# Patient Record
Sex: FEMALE | Race: WHITE | NOT HISPANIC OR LATINO
[De-identification: names, ages, dates, MRNs, and addresses within clinical notes are randomized per-mention and may not be internally consistent; named-entity substitution may affect disease eponyms.]

---

## 2017-06-08 ENCOUNTER — RESULT REVIEW (OUTPATIENT)
Age: 77
End: 2017-06-08

## 2017-07-26 ENCOUNTER — APPOINTMENT (OUTPATIENT)
Dept: ELECTROPHYSIOLOGY | Facility: CLINIC | Age: 77
End: 2017-07-26

## 2017-08-30 ENCOUNTER — NON-APPOINTMENT (OUTPATIENT)
Age: 77
End: 2017-08-30

## 2017-08-30 ENCOUNTER — APPOINTMENT (OUTPATIENT)
Dept: ELECTROPHYSIOLOGY | Facility: CLINIC | Age: 77
End: 2017-08-30

## 2017-08-30 VITALS — HEART RATE: 83 BPM | OXYGEN SATURATION: 98 % | DIASTOLIC BLOOD PRESSURE: 78 MMHG | SYSTOLIC BLOOD PRESSURE: 165 MMHG

## 2017-08-30 VITALS — DIASTOLIC BLOOD PRESSURE: 80 MMHG | SYSTOLIC BLOOD PRESSURE: 135 MMHG

## 2017-12-04 ENCOUNTER — APPOINTMENT (OUTPATIENT)
Dept: ULTRASOUND IMAGING | Facility: CLINIC | Age: 77
End: 2017-12-04
Payer: MEDICARE

## 2017-12-04 ENCOUNTER — OUTPATIENT (OUTPATIENT)
Dept: OUTPATIENT SERVICES | Facility: HOSPITAL | Age: 77
LOS: 1 days | End: 2017-12-04
Payer: MEDICARE

## 2017-12-04 DIAGNOSIS — Z98.89 OTHER SPECIFIED POSTPROCEDURAL STATES: Chronic | ICD-10-CM

## 2017-12-04 DIAGNOSIS — Z00.8 ENCOUNTER FOR OTHER GENERAL EXAMINATION: ICD-10-CM

## 2017-12-04 PROCEDURE — 93971 EXTREMITY STUDY: CPT | Mod: 26

## 2017-12-04 PROCEDURE — 93971 EXTREMITY STUDY: CPT

## 2018-03-05 ENCOUNTER — APPOINTMENT (OUTPATIENT)
Dept: INFECTIOUS DISEASE | Facility: CLINIC | Age: 78
End: 2018-03-05
Payer: SELF-PAY

## 2018-03-05 DIAGNOSIS — Z71.89 OTHER SPECIFIED COUNSELING: ICD-10-CM

## 2018-03-05 PROCEDURE — 90472 IMMUNIZATION ADMIN EACH ADD: CPT | Mod: NC

## 2018-03-05 PROCEDURE — 90471 IMMUNIZATION ADMIN: CPT | Mod: NC

## 2018-03-05 PROCEDURE — 99401 PREV MED CNSL INDIV APPRX 15: CPT | Mod: 25

## 2018-03-05 PROCEDURE — 90632 HEPA VACCINE ADULT IM: CPT

## 2018-03-05 PROCEDURE — 90691 TYPHOID VACCINE IM: CPT

## 2018-03-05 RX ORDER — ATOVAQUONE AND PROGUANIL HYDROCHLORIDE 250; 100 MG/1; MG/1
250-100 TABLET, FILM COATED ORAL DAILY
Qty: 30 | Refills: 0 | Status: ACTIVE | COMMUNITY
Start: 2018-03-05 | End: 1900-01-01

## 2018-07-13 ENCOUNTER — APPOINTMENT (OUTPATIENT)
Dept: PLASTIC SURGERY | Facility: CLINIC | Age: 78
End: 2018-07-13

## 2018-12-11 ENCOUNTER — APPOINTMENT (OUTPATIENT)
Dept: INFECTIOUS DISEASE | Facility: CLINIC | Age: 78
End: 2018-12-11
Payer: SELF-PAY

## 2018-12-11 PROCEDURE — 90632 HEPA VACCINE ADULT IM: CPT

## 2018-12-11 PROCEDURE — 90471 IMMUNIZATION ADMIN: CPT | Mod: NC

## 2019-03-18 ENCOUNTER — APPOINTMENT (OUTPATIENT)
Dept: ORTHOPEDIC SURGERY | Facility: CLINIC | Age: 79
End: 2019-03-18

## 2019-03-28 ENCOUNTER — APPOINTMENT (OUTPATIENT)
Dept: ORTHOPEDIC SURGERY | Facility: CLINIC | Age: 79
End: 2019-03-28
Payer: MEDICARE

## 2019-03-28 DIAGNOSIS — G56.01 CARPAL TUNNEL SYNDROME, RIGHT UPPER LIMB: ICD-10-CM

## 2019-03-28 PROCEDURE — 99203 OFFICE O/P NEW LOW 30 MIN: CPT

## 2019-03-28 PROCEDURE — 99213 OFFICE O/P EST LOW 20 MIN: CPT

## 2019-03-28 PROCEDURE — 73110 X-RAY EXAM OF WRIST: CPT | Mod: RT

## 2019-03-28 NOTE — HISTORY OF PRESENT ILLNESS
[de-identified] : Patient is a 78 year old female who presents c/o right wrist and thumb discomfort, present for several months. She localizes the symptoms over the radial aspect at the base of the thumb. She describes the symptoms as burning in quality rather than pain. There is also intermittent numbness in the hand. She states that the burning in the thumb and wrist does not radiate into the hand or forearm.

## 2019-03-28 NOTE — END OF VISIT
[FreeTextEntry3] : I, Moncho Wilkes MD, ordering physician, have read and attest that all the information, medical decision making and discharge instructions within are true and accurate.

## 2019-03-28 NOTE — ADDENDUM
[FreeTextEntry1] : I, Renea Pacheco wrote this note acting as a scribe for Dr. Moncho Wilkes on Mar 28, 2019.

## 2019-03-28 NOTE — PHYSICAL EXAM
[de-identified] : Patient is WDWN, alert, and in no acute distress. Breathing is unlabored. She is grossly oriented to person, place, and time. \par \par Right Hand: There is basal joint tenderness, full arc of motion in the fingers with pain on thumb ROM. No pain with resisted wrist flexion. All intrinsic and extrinsic hand muscles 5/5. No joint instability on provocative testing. Sensation is intact to light touch. There are no skin lesions or discoloration. \par Tests/Signs: Positive thumb grind test. Positive Phalen's.  [de-identified] : AP, lateral and oblique views of the right hand were obtained today on 03/28/2019 and revealed thumb basal joint osteoarthritis.

## 2019-03-28 NOTE — DISCUSSION/SUMMARY
[de-identified] : The underlying pathophysiology was reviewed with the patient. Treatment options were discussed including; observation, NSAIDS, analgesics, injection, bracing, and surgical intervention. \par \par Patient may return to the office for a cortisone injection for either the carpal tunnel or basal joint osteoarthritis.\par The patient was advised to soak hand in warm water and Epsom salt. \par Wear a thumb spica brace as needed. \par NSAIDs as tolerated. \par Follow up as needed.

## 2019-03-28 NOTE — PHYSICAL EXAM
[de-identified] : Patient is WDWN, alert, and in no acute distress. Breathing is unlabored. She is grossly oriented to person, place, and time. \par \par Right Hand: There is basal joint tenderness, full arc of motion in the fingers with pain on thumb ROM. No pain with resisted wrist flexion. All intrinsic and extrinsic hand muscles 5/5. No joint instability on provocative testing. Sensation is intact to light touch. There are no skin lesions or discoloration. \par Tests/Signs: Positive thumb grind test. Positive Phalen's.  [de-identified] : AP, lateral and oblique views of the right hand were obtained today on 03/28/2019 and revealed thumb basal joint osteoarthritis.

## 2019-03-28 NOTE — CONSULT LETTER
[Dear  ___] : Dear  [unfilled], [Consult Letter:] : I had the pleasure of evaluating your patient, [unfilled]. [Please see my note below.] : Please see my note below. [Consult Closing:] : Thank you very much for allowing me to participate in the care of this patient.  If you have any questions, please do not hesitate to contact me. [Sincerely,] : Sincerely, [FreeTextEntry2] : LOLIS DIAL  [FreeTextEntry3] : Dr. Moncho Wilkes

## 2019-03-28 NOTE — DISCUSSION/SUMMARY
[de-identified] : The underlying pathophysiology was reviewed with the patient. Treatment options were discussed including; observation, NSAIDS, analgesics, injection, bracing, and surgical intervention. \par \par Patient may return to the office for a cortisone injection for either the carpal tunnel or basal joint osteoarthritis.\par The patient was advised to soak hand in warm water and Epsom salt. \par Wear a thumb spica brace as needed. \par NSAIDs as tolerated. \par Follow up as needed.

## 2019-03-28 NOTE — HISTORY OF PRESENT ILLNESS
[de-identified] : Patient is a 78 year old female who presents c/o right wrist and thumb discomfort, present for several months. She localizes the symptoms over the radial aspect at the base of the thumb. She describes the symptoms as burning in quality rather than pain. There is also intermittent numbness in the hand. She states that the burning in the thumb and wrist does not radiate into the hand or forearm.

## 2019-05-02 ENCOUNTER — APPOINTMENT (OUTPATIENT)
Dept: ORTHOPEDIC SURGERY | Facility: CLINIC | Age: 79
End: 2019-05-02
Payer: MEDICARE

## 2019-05-02 PROCEDURE — 20605 DRAIN/INJ JOINT/BURSA W/O US: CPT | Mod: RT

## 2019-05-02 PROCEDURE — 99214 OFFICE O/P EST MOD 30 MIN: CPT | Mod: 25

## 2019-05-02 NOTE — HISTORY OF PRESENT ILLNESS
[de-identified] : Patient is a 78 year old female who presents for a followup visit involving the right thumb ,present for several months. She localizes the symptoms over the base of the thumb. There is difficulty with gripping and grasping. There is also intermittent numbness in the hand. She states that the pain in the thumb does not radiate into the hand or forearm. [facility].

## 2019-05-02 NOTE — PHYSICAL EXAM
[de-identified] : Patient is WDWN, alert, and in no acute distress. Breathing is unlabored. She is grossly oriented to person, place, and time. \par \par Right Hand: There is basal joint tenderness, full arc of motion in the fingers with pain on thumb ROM. No pain with resisted wrist flexion. All intrinsic and extrinsic hand muscles 5/5. No joint instability on provocative testing. Sensation is intact to light touch. There are no skin lesions or discoloration. \par Tests/Signs: Positive thumb grind test. Positive Phalen's.  [de-identified] : No imaging done today.

## 2019-05-02 NOTE — ADDENDUM
[FreeTextEntry1] : I, Renea Pacheco wrote this note acting as a scribe for Dr. Moncho Wilkes on May 02, 2019.

## 2019-06-02 ENCOUNTER — TRANSCRIPTION ENCOUNTER (OUTPATIENT)
Age: 79
End: 2019-06-02

## 2019-06-03 ENCOUNTER — APPOINTMENT (OUTPATIENT)
Dept: ORTHOPEDIC SURGERY | Facility: CLINIC | Age: 79
End: 2019-06-03
Payer: MEDICARE

## 2019-06-03 ENCOUNTER — OUTPATIENT (OUTPATIENT)
Dept: OUTPATIENT SERVICES | Facility: HOSPITAL | Age: 79
LOS: 1 days | End: 2019-06-03
Payer: MEDICARE

## 2019-06-03 ENCOUNTER — APPOINTMENT (OUTPATIENT)
Dept: MRI IMAGING | Facility: CLINIC | Age: 79
End: 2019-06-03

## 2019-06-03 DIAGNOSIS — Z98.89 OTHER SPECIFIED POSTPROCEDURAL STATES: Chronic | ICD-10-CM

## 2019-06-03 DIAGNOSIS — Z00.00 ENCOUNTER FOR GENERAL ADULT MEDICAL EXAMINATION WITHOUT ABNORMAL FINDINGS: ICD-10-CM

## 2019-06-03 PROCEDURE — 99214 OFFICE O/P EST MOD 30 MIN: CPT

## 2019-06-03 PROCEDURE — 73721 MRI JNT OF LWR EXTRE W/O DYE: CPT | Mod: 26,LT

## 2019-06-03 PROCEDURE — 73721 MRI JNT OF LWR EXTRE W/O DYE: CPT

## 2019-06-03 NOTE — HISTORY OF PRESENT ILLNESS
[de-identified] : 78 year old female presents for an evaluation of left hip pain that began on 5/29/2019 while she was on vacation in [place] and fell down a step sustaining a direct impact to her left hip. Today she is ambulating with the assistance of a cane since her injury due to pain with walking and weight bearing. ON 6/2/2019 she was seen at Urgent Care and had XRays obtained of her left hip which revealed a suspected acute nondisplaced fractures of the left inferior pubic ramus and left puboacetabular junction. Today she says that her pain is located along the groin region of her left hip and that she has continued to experience pain with walking, and weight bearing. She has been taking Diclofenac for her pain at this time.

## 2019-06-03 NOTE — END OF VISIT
[FreeTextEntry3] : All medical record entries made by the [facility]unior[facility] were at my, Dr. Niko Wilson, direction and personally dictated by me on 06/03/2019. I have reviewed the chart and agree that the record accurately reflects my personal performance of the history, physical exam, assessment and plan. I have also personally directed, reviewed, and agreed with the chart.

## 2019-06-03 NOTE — DISCUSSION/SUMMARY
[de-identified] : The underlying pathophysiology was reviewed in great detail with the patient as well as the various treatment options, including ice, analgesics, NSAIDs, Physical therapy, steroid injections.\par \par A prescription for Physical Therapy was provided. She is to wait until MRI results are acquired.\par \par Activity modifications and restrictions were discussed, she is to continue to use the assistance of the cane to ambulate.\par \par An MRI of the pelvis and left hip was ordered to rule out femoral neck fracture.\par \par FU after imaging is obtained.

## 2019-06-03 NOTE — ADDENDUM
[FreeTextEntry1] : I, Kelly Long, acted solely as a scribe for Dr. Niko Wilson on this date 06/03/2019.

## 2019-06-03 NOTE — PHYSICAL EXAM
[Cane] : ambulates with cane [Normal RLE] : Right Lower Extremity: No scars, rashes, lesions, ulcers, skin intact [Normal LLE] : Left Lower Extremity: No scars, rashes, lesions, ulcers, skin intact [Normal Touch] : sensation intact for touch [Normal] : No swelling, no edema, normal pedal pulses and normal temperature [Obese] : obese [Poor Appearance] : well-appearing [Acute Distress] : not in acute distress [de-identified] : Right Lower Extremity\par o Hip :\par ¦ Inspection/Palpation : no tenderness, no swelling, no deformity\par ¦ Range of Motion : full and painless in all planes, no crepitus\par ¦ Stability : joint stability intact\par ¦ Strength : unable to perform straight leg raise\par ¦ Tests and Signs : all tests for stability normal\par o Muscle Tone : tone normal\par o Muscle Bulk : normal muscle bulk present\par o Skin : no erythema, no ecchymosis \par o Sensation : sensation to light touch intact\par o Vascular Exam : no edema, no cyanosis, dorsalis pedis artery pulse 2+, posterior tibial artery pulse 2+\par \par Left Lower Extremity\par o Hip :\par ¦ Inspection/Palpation : tender over the superior pubic ramus, no swelling, no deformity\par ¦ Range of Motion : full and painless in all planes, no crepitus\par ¦ Stability : joint stability intact\par ¦ Strength : hip flexion 5/5\par ¦ Tests and Signs : all tests for stability normal\par o Muscle Tone : tone normal\par o Muscle Bulk : normal muscle bulk present\par o Skin : no erythema, no ecchymosis \par o Sensation : sensation to light touch intact\par o Vascular Exam : no edema, no cyanosis, dorsalis pedis artery pulse 2+, posterior tibial artery pulse 2+  [de-identified] : o XRays of the left hip and pelvis were obtained at [facility] on 6/2/2109, revealed:\par Suspected acute nondisplaced fractures of the left inferior pubic ramus and left puboacetabular junction. No other fractures are demonstrated. Hip joint space are maintained. Partially imaged lower lumbar spondylosis.

## 2019-06-06 ENCOUNTER — OTHER (OUTPATIENT)
Age: 79
End: 2019-06-06

## 2019-07-01 ENCOUNTER — APPOINTMENT (OUTPATIENT)
Dept: ORTHOPEDIC SURGERY | Facility: CLINIC | Age: 79
End: 2019-07-01
Payer: MEDICARE

## 2019-07-01 DIAGNOSIS — M79.601 PAIN IN RIGHT ARM: ICD-10-CM

## 2019-07-01 DIAGNOSIS — S46.211A STRAIN OF MUSCLE, FASCIA AND TENDON OF OTHER PARTS OF BICEPS, RIGHT ARM, INITIAL ENCOUNTER: ICD-10-CM

## 2019-07-01 PROCEDURE — 99213 OFFICE O/P EST LOW 20 MIN: CPT

## 2019-07-01 NOTE — HISTORY OF PRESENT ILLNESS
[de-identified] : Pt is a 78 year old female fell on 5/29/19 while in [facility] and sustained two pelvic fractures.  She used crutches as she recovered and states that she has started to development pain in her right arm since using the crutches. She has since transitioned to ambulating with a cane.  She states that the pain is in her forearm and also over her biceps.  She has swelling in her elbow that increases as the day progresses.  She believes that she has exacerbated the symptoms from an old injury in her arm.  She had biceps tendinitis 6 years ago confirmed by MRI after using heavy weights at the gym.  She states that the pain feels exactly as it did when she injured her arm at that time.  She has crepitus in the right shoulder as well.  She notes a history of a right RTC tear but she states that it is not painful at this time.

## 2019-07-01 NOTE — DISCUSSION/SUMMARY
[de-identified] : Continue to ambulate with the cane. \par She was advised to moderate her level of activity to allow for healing of the hip fractures and the right upper extremities. \par Range of motion exercises encouraged.\par NSAIDs as tolerated.\par Follow up as needed.

## 2019-07-01 NOTE — ADDENDUM
[FreeTextEntry1] : I, Renea Pacheco wrote this note acting as a scribe for Dr. Moncho Wilkes on Jul 01, 2019.

## 2019-07-01 NOTE — PHYSICAL EXAM
[de-identified] : Patient is WDWN, alert, and in no acute distress. Breathing is unlabored. She is grossly oriented to person, place, and time. \par \par Right Shoulder and Elbow:\par   Inspection/Palpation: There is tenderness over the biceps tendon and lateral epicondyle. Edema over the forearm. No deformities. No skin lesions or discoloration.\par   Range of Motion: Full extension and flexion. No crepitance. \par   Strength: Flexion and extension 5/5\par   Stability: No joint instability on provocative testing. \par \par \par  [de-identified] : MRI left hip on 06/03/2019 at [facility]: Bone marrow edema in the left inferior pubic ramus and anterior left acetabulum likely related to nondisplaced fractures. Linear bone marrow edema in the right sacral ala consistent with nondisplaced fracture. Edema in the left obturator externus muscle and adductor musculature adjacent to the inferior pubic ramus fracture site. \par \par MRI of the right elbow on 08/15/2013: severe insertional biceps tendinosis a moderate to high-grade partial tar of the radial tuberosity. There is subjacent reactive bone marrow edema. There is severe bicipital radial bursitis.

## 2019-08-26 ENCOUNTER — FORM ENCOUNTER (OUTPATIENT)
Age: 79
End: 2019-08-26

## 2019-08-27 ENCOUNTER — APPOINTMENT (OUTPATIENT)
Dept: MRI IMAGING | Facility: IMAGING CENTER | Age: 79
End: 2019-08-27
Payer: MEDICARE

## 2019-08-27 ENCOUNTER — APPOINTMENT (OUTPATIENT)
Dept: ORTHOPEDIC SURGERY | Facility: CLINIC | Age: 79
End: 2019-08-27
Payer: MEDICARE

## 2019-08-27 ENCOUNTER — OUTPATIENT (OUTPATIENT)
Dept: OUTPATIENT SERVICES | Facility: HOSPITAL | Age: 79
LOS: 1 days | End: 2019-08-27
Payer: MEDICARE

## 2019-08-27 VITALS — HEIGHT: 68 IN | WEIGHT: 182 LBS | BODY MASS INDEX: 27.58 KG/M2

## 2019-08-27 DIAGNOSIS — Z98.89 OTHER SPECIFIED POSTPROCEDURAL STATES: Chronic | ICD-10-CM

## 2019-08-27 DIAGNOSIS — M46.1 SACROILIITIS, NOT ELSEWHERE CLASSIFIED: ICD-10-CM

## 2019-08-27 PROCEDURE — 99214 OFFICE O/P EST MOD 30 MIN: CPT

## 2019-08-27 PROCEDURE — 72195 MRI PELVIS W/O DYE: CPT

## 2019-08-27 PROCEDURE — 72190 X-RAY EXAM OF PELVIS: CPT

## 2019-08-27 PROCEDURE — 72195 MRI PELVIS W/O DYE: CPT | Mod: 26

## 2019-08-27 NOTE — PHYSICAL EXAM
[Normal LLE] : Left Lower Extremity: No scars, rashes, lesions, ulcers, skin intact [Normal RLE] : Right Lower Extremity: No scars, rashes, lesions, ulcers, skin intact [Normal Touch] : sensation intact for touch [Obese] : obese [Normal] : Gait: normal [Poor Appearance] : well-appearing [Acute Distress] : not in acute distress [de-identified] : Right Lower Extremity\par o Hip :\par ¦ Inspection/Palpation : mild tenderness over the greater trochanter, no swelling, no deformity\par ¦ Range of Motion : full and painless in all planes, no crepitus\par ¦ Stability : joint stability intact\par ¦ Strength : Hip flexion 5/5 Tib/Ant 5/5 EHL 5/5 \par ¦ Tests : Straight leg test (-), SI joint provocation test (+)\par o Muscle Tone : tone normal\par o Muscle Bulk : normal muscle bulk present\par o Skin : no erythema, no ecchymosis \par o Sensation : sensation to light touch intact\par o Vascular Exam : no edema, no cyanosis, dorsalis pedis artery pulse 2+, posterior tibial artery pulse 2+\par \par Left Lower Extremity\par o Hip :\par ¦ Inspection/Palpation : tenderness over the superior pubic ramus, no swelling, no deformity\par ¦ Range of Motion : full and painless in all planes, no crepitus\par ¦ Stability : joint stability intact\par ¦ Strength : hip flexion 5/5, EHL 5/5 \par o Muscle Tone : tone normal\par o Muscle Bulk : normal muscle bulk present\par o Skin : no erythema, no ecchymosis \par o Sensation : sensation to light touch intact\par o Vascular Exam : no edema, no cyanosis, dorsalis pedis artery pulse 2+, posterior tibial artery pulse 2+  [de-identified] : o Pelvis : AP, inlet and outlet views were obtained, there are no soft tissue abnormalities, evidence of prior  left superior and inferior pubic ramus fractures, mild osteoarthritis of the SI joints.\par \par

## 2019-08-27 NOTE — DISCUSSION/SUMMARY
[de-identified] : The underlying pathophysiology was reviewed in great detail with the patient as well as the various treatment options, including ice, analgesics, NSAIDs, Physical therapy, steroid injections.\par \par An MRI of the pelvis was ordered to rule out occult fracture.\par \par FU after imaging is obtained.

## 2019-08-27 NOTE — ADDENDUM
[FreeTextEntry1] : I, Mckayla Arauz, acted solely as a scribe for Dr. Niko Wilson on this date 08/27/2019.

## 2019-08-27 NOTE — END OF VISIT
[FreeTextEntry3] : All medical record entries made by the Yunioribjohn were at my, Dr. Niko Wilson, direction and personally dictated by me on 08/27/2019. I have reviewed the chart and agree that the record accurately reflects my personal performance of the history, physical exam, assessment and plan. I have also personally directed, reviewed, and agreed with the chart.

## 2019-08-27 NOTE — HISTORY OF PRESENT ILLNESS
[de-identified] : 79 year old female presents for an evaluation of right hip pain. Of note, she has a history of right sided sciatica. She had been seeing a chiropractor and notes that her pain had dissipated, which prompted her to return to working out at the gym. Recently while exercising, she noted a sharp pain to her right hip. Today she describes an aching pain about her right hip and notes that she is unable to lift her right leg above the ground. She also experiences occasional "locking" of her right lower extremity. She has been taking Advil to manage her pain at this time. Of note, the patient has a history of of prior fractures of the left inferior pubic ramus and anterior left acetabulum.

## 2019-08-29 ENCOUNTER — TRANSCRIPTION ENCOUNTER (OUTPATIENT)
Age: 79
End: 2019-08-29

## 2019-10-08 ENCOUNTER — APPOINTMENT (OUTPATIENT)
Dept: ORTHOPEDIC SURGERY | Facility: CLINIC | Age: 79
End: 2019-10-08
Payer: MEDICARE

## 2019-10-08 VITALS — BODY MASS INDEX: 27.58 KG/M2 | HEIGHT: 68 IN | WEIGHT: 182 LBS

## 2019-10-08 DIAGNOSIS — S32.592A OTHER SPECIFIED FRACTURE OF LEFT PUBIS, INITIAL ENCOUNTER FOR CLOSED FRACTURE: ICD-10-CM

## 2019-10-08 DIAGNOSIS — S32.435D: ICD-10-CM

## 2019-10-08 PROCEDURE — 73502 X-RAY EXAM HIP UNI 2-3 VIEWS: CPT | Mod: LT

## 2019-10-08 PROCEDURE — 99214 OFFICE O/P EST MOD 30 MIN: CPT

## 2019-10-08 NOTE — PHYSICAL EXAM
[Normal LLE] : Left Lower Extremity: No scars, rashes, lesions, ulcers, skin intact [Normal RLE] : Right Lower Extremity: No scars, rashes, lesions, ulcers, skin intact [Normal Touch] : sensation intact for touch [Obese] : obese [Normal] : Gait: normal [Poor Appearance] : well-appearing [Acute Distress] : not in acute distress [de-identified] : o Left Hip and pelvis : AP and lateral views were obtained, there are no soft tissue abnormalities, evidence of prior left superior and inferior pubic ramus fractures well healed with no changes of the acetabulum, normal bone density, mild osteoarthritis of the SI joints, no bony lesions. [de-identified] : Left Lower Extremity\par o Hip :\par ¦ Inspection/Palpation : no tenderness over the pubic ramus, no swelling, no deformity\par ¦ Range of Motion : full and painless in all planes, no crepitus\par ¦ Stability : joint stability intact\par ¦ Strength : hip flexion 5/5, abductor strength 5/5, gluteus medius 5/5 \par ¦ Tests and Signs : JAE Test (+), FADIR Test (-)\par o Muscle Tone : tone normal\par o Muscle Bulk : normal muscle bulk present\par o Skin : no erythema, no ecchymosis \par o Sensation : sensation to light touch intact\par o Vascular Exam : no edema, no cyanosis, dorsalis pedis artery pulse 2+, posterior tibial artery pulse 2+

## 2019-10-08 NOTE — END OF VISIT
[FreeTextEntry3] : All medical record entries made by the Yunioribjohn were at my, Dr. Niko Wilson, direction and personally dictated by me on 10/08/2019. I have reviewed the chart and agree that the record accurately reflects my personal performance of the history, physical exam, assessment and plan. I have also personally directed, reviewed, and agreed with the chart.

## 2019-10-08 NOTE — DISCUSSION/SUMMARY
[de-identified] : The underlying pathophysiology was reviewed in great detail with the patient as well as the various treatment options, including ice, analgesics, NSAIDs, Physical therapy, steroid injections.\par \par I recommend that she follow up with Dr. Bartholomew or with a radiologist for a corticosteroid injection of the left hip if her symptoms do not improve. \par \par FU PRN.

## 2019-10-08 NOTE — ADDENDUM
[FreeTextEntry1] : I, Mckayla Arauz, acted solely as a scribe for Dr. Niko Wilson on this date 10/08/2019.

## 2019-10-08 NOTE — HISTORY OF PRESENT ILLNESS
[de-identified] : 79 year old female presents for an evaluation of left hip pain that began on 5/29/2019 while she was on vacation in [place] and fell down a step and sustained acute nondisplaced fractures of the left inferior pubic ramus and left puboacetabular junction. On 6/3/2019, an MRI of the left hip was obtained which revealed bone marrow edema in the left inferior pubic ramus and anterior left acetabulum likely related to nondisplaced fractures, linear bone marrow edema in the right sacral ala consistent with nondisplaced fracture, as well as edema in the left obturator externus muscle and adductor musculature adjacent to the inferior pubic ramus fracture site. On 08/27/2019, an MRI of the pelvis was obtained which revealed a new obliquely oriented nondisplaced trabecular fracture of the superior aspect of the left acetabulum extending into the inferior aspect of the left iliac wing, a re-demonstrated nondisplaced right sacral alar fracture, as well as re-demonstrated fractures of the left puboacetabular junction and left inferior pubic ramus. She has recently obtained a bone density test which was positive for osteopenia. The patient reports that she continues to experience an aching pain located along the groin region of her left hip that is constant in nature. Her symptoms are exacerbated with walking, weight bearing, and with lifting her left lower extremity. She has no other complaints at this time.

## 2019-10-22 ENCOUNTER — APPOINTMENT (OUTPATIENT)
Dept: ORTHOPEDIC SURGERY | Facility: CLINIC | Age: 79
End: 2019-10-22
Payer: MEDICARE

## 2019-10-22 VITALS — WEIGHT: 182 LBS | HEIGHT: 68 IN | BODY MASS INDEX: 27.58 KG/M2

## 2019-10-22 DIAGNOSIS — M25.552 PAIN IN LEFT HIP: ICD-10-CM

## 2019-10-22 PROCEDURE — 99214 OFFICE O/P EST MOD 30 MIN: CPT | Mod: 25

## 2019-10-22 PROCEDURE — 20611 DRAIN/INJ JOINT/BURSA W/US: CPT | Mod: LT

## 2019-10-22 NOTE — CONSULT LETTER
[Dear  ___] : Dear  [unfilled], [Consult Letter:] : I had the pleasure of evaluating your patient, [unfilled]. [Consult Closing:] : Thank you very much for allowing me to participate in the care of this patient.  If you have any questions, please do not hesitate to contact me. [Sincerely,] : Sincerely, [FreeTextEntry3] : Catrina Bartholomew MD, EdM\par Sports Medicine [facility]\par  [FreeTextEntry1] : Please see note dated 10/22/19.

## 2019-10-22 NOTE — HISTORY OF PRESENT ILLNESS
[de-identified] : Lucero is 79F who is referred by Dr. Wilson for left hip injection. She has a history of nondisplaced fractures of the left inferior pubic ramus and left puboacetabular junction that occurred in 5/19 after a fall.  MRI of the pelvis on 8/27/19 demonstrated a trabecular fracture of the superior aspect of the left acetabulum extending into the inferior aspect of the left iliac wing and nondisplaced fracture of the right sacral ala.  She continues to have significant left groin pain which is limiting her ability to participate in exercises classes.  No new trauma.  Denies back injury.

## 2019-10-22 NOTE — DISCUSSION/SUMMARY
[de-identified] : Lucero presents for ultrasound guided intra articular femoroacetabular cortisone injection. She tolerated the procedure well.  She has had viscosupplementation in her knees in the past and says it has worked well for her.  Can potentially use it in her hip joint, should this injection be helpful.  She will follow up with Dr. Wilson, but I am happy to see her back for injections as needed.\par \par Catrina Bartholomew MD, EdM\par Sports Medicine PM[facility]\par

## 2019-10-22 NOTE — PHYSICAL EXAM
[de-identified] : EXAM: \par Gen: in no acute distress, seated comfortably, moving easily\par Skin: No discoloration, rashes; on palpation skin is dry, \par Neuro: Normal sensation all dermatomes, motor all myotomes\par Vascular: Normal pulses, no edema, normal temperature\par Coordination and balance: Normal\par Psych: normal mood and affect, non pressured speech, alert and oriented x3\par Musculoskeletal:\par left hip\par Inspection: no lesions\par HIPS/PELVIS -\par Symmetric in standing and lying \par Hip maneuvers:\par  Range of motion pain with flexion, IR/ER\par passive hip impingement sign + on left\par Log roll negative\par Sacroiliac maneuvers:no TTP of  bilateral PSIS \par AP glide negative\par Addi's negative\par Resisted active straight leg raise negative\par NEURO - Normal bulk and tone \par LE strength 5/5 including hip flexion, knee flexion, knee extension, ankle dorsiflexion, ankle plantarflexion, eversion, and EHL \par Sensation - intact to light touch in bilateral lower extremities. \par no Clonus\par Coordination was age appropriate and intact in all 4 limbs. \par GAIT - Normal base, normal stride length, non-antalgic\par

## 2019-10-22 NOTE — PROCEDURE
[Injection] : Injection [Hip Joint] : hip joint [Left] : of the left [Joint Pain] : Joint pain [Patient] : patient [Risk] : risk [Benefits] : benefits [Bleeding] : bleeding [Alternatives] : alternatives [Allergic Reaction] : allergic reaction [Infection] : infection [___mL] : [unfilled] ~UmL of lidocaine [Verbal Consent Obtained] : verbal consent was obtained prior to the procedure [Lateral] : lateral [Ultrasound Guided] : The procedure was ultrasound guided.   [Inferior] : inferior [20] : a 20-gauge [Spinal] : spinal needle [Methylpred. 40mg/mL___(mL)] : [unfilled] mL 40mg/mL methylprednisolone [1% Lidocaine___(mL)] : [unfilled] mL of 1% Lidocaine [Tolerated Well] : The patient tolerated the procedure well [Bandage Applied] : a bandage [None] : none [PRN] : as needed [FreeTextEntry1] : Chlorhexidine

## 2020-01-23 ENCOUNTER — APPOINTMENT (OUTPATIENT)
Dept: ORTHOPEDIC SURGERY | Facility: CLINIC | Age: 80
End: 2020-01-23
Payer: MEDICARE

## 2020-01-23 VITALS — BODY MASS INDEX: 27.58 KG/M2 | HEIGHT: 68 IN | WEIGHT: 182 LBS

## 2020-01-23 DIAGNOSIS — M79.646 PAIN IN UNSPECIFIED FINGER(S): ICD-10-CM

## 2020-01-23 DIAGNOSIS — R22.32 LOCALIZED SWELLING, MASS AND LUMP, LEFT UPPER LIMB: ICD-10-CM

## 2020-01-23 PROCEDURE — 20600 DRAIN/INJ JOINT/BURSA W/O US: CPT | Mod: F2

## 2020-01-23 PROCEDURE — 99213 OFFICE O/P EST LOW 20 MIN: CPT | Mod: 25

## 2020-01-23 PROCEDURE — 73140 X-RAY EXAM OF FINGER(S): CPT | Mod: F2

## 2020-01-23 NOTE — ADDENDUM
[FreeTextEntry1] : I, Renea Pacheco wrote this note acting as a scribe for Dr. Moncho Wilkes on Jan 23, 2020.

## 2020-01-23 NOTE — HISTORY OF PRESENT ILLNESS
[Right] : right hand dominant [FreeTextEntry1] : Patient is a 79 year old female who presents c/o left long finger pain and disability.  She noticed a mass develop on the volar portion of the PIP.  She is unable to completely flex the finger.  It is tender to touch.  She has been treated twice in the past for left long finger trigger finger.   She notes that she is traveling to [place] next week. \par

## 2020-01-23 NOTE — DISCUSSION/SUMMARY
[FreeTextEntry1] : Left long finger PIP flexor tendon mass. \par \par At this point, I recommend an aspiration. 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 \par Follow up if the mass becomes larger or painful.

## 2020-01-27 ENCOUNTER — TRANSCRIPTION ENCOUNTER (OUTPATIENT)
Age: 80
End: 2020-01-27

## 2020-02-20 ENCOUNTER — TRANSCRIPTION ENCOUNTER (OUTPATIENT)
Age: 80
End: 2020-02-20

## 2020-02-20 ENCOUNTER — APPOINTMENT (OUTPATIENT)
Dept: ORTHOPEDIC SURGERY | Facility: CLINIC | Age: 80
End: 2020-02-20
Payer: MEDICARE

## 2020-02-20 DIAGNOSIS — S92.342A DISPLACED FRACTURE OF FOURTH METATARSAL BONE, LEFT FOOT, INITIAL ENCOUNTER FOR CLOSED FRACTURE: ICD-10-CM

## 2020-02-20 DIAGNOSIS — S92.352A DISPLACED FRACTURE OF FIFTH METATARSAL BONE, LEFT FOOT, INITIAL ENCOUNTER FOR CLOSED FRACTURE: ICD-10-CM

## 2020-02-20 PROCEDURE — 99213 OFFICE O/P EST LOW 20 MIN: CPT

## 2020-02-20 RX ORDER — ZOLPIDEM TARTRATE 10 MG/1
10 TABLET ORAL
Qty: 30 | Refills: 0 | Status: ACTIVE | COMMUNITY
Start: 2020-01-03

## 2020-02-21 NOTE — HISTORY OF PRESENT ILLNESS
[de-identified] : Patient is a 80 y/o female who presents with a left fourth and fifth metatarsal fractures.  She was hiking in [place] when her hiking pole malfunctioned and collapsed.  She ended up on her back but her foot was twisted underneath her.  She did not go to the hospital there, instead she continued to wear the sturdy, rigid sole hiking boots until she returned home.  She went to Urgent Care when she returned and had xrays taken.  They revealed a left fourth and fifth metatarsal fracture.  She has swelling and ecchymosis in the foot and toes.  She has moderate pain when she applies weight to the toes, so she has been modifying by placing the majority of the pressure on the heel.  She takes Ibuprofen for pain as needed.  She walks with a cast shoe.  She is leaving for a trip to [place] on March 23 and she would like to know if her foot will be healed by then.

## 2020-02-21 NOTE — DISCUSSION/SUMMARY
[de-identified] : Patient was advised to continue with the cast shoe for the interim, however to proceed with caution while weight bearing, as increasing the applied force to the foot can cause displacement of the metatarsal fractures. \par The patient was advised to soak the foot in warm water and Epsom salt. \par NSAIDs as tolerated. \par Follow up in 2 weeks for repeat x-rays.

## 2020-02-21 NOTE — HISTORY OF PRESENT ILLNESS
[de-identified] : Patient is a 78 y/o female who presents with a left fourth and fifth metatarsal fractures.  She was hiking in [place] when her hiking pole malfunctioned and collapsed.  She ended up on her back but her foot was twisted underneath her.  She did not go to the hospital there, instead she continued to wear the sturdy, rigid sole hiking boots until she returned home.  She went to Urgent Care when she returned and had xrays taken.  They revealed a left fourth and fifth metatarsal fracture.  She has swelling and ecchymosis in the foot and toes.  She has moderate pain when she applies weight to the toes, so she has been modifying by placing the majority of the pressure on the heel.  She takes Ibuprofen for pain as needed.  She walks with a cast shoe.  She is leaving for a trip to [place] on March 23 and she would like to know if her foot will be healed by then.

## 2020-02-21 NOTE — PHYSICAL EXAM
[de-identified] : X-rays of the left foot on 02/20/2020 at [facility] revealed the bones to be borderline osteopenic. There are mildly displaced oblique fractures of the shafts of the fourth and fifth metatarsals. Joint spaces are preserved. Mild soft tissue swelling in the midfoot. \par  [de-identified] : Patient is WDWN, alert, and in no acute distress. Breathing is unlabored. She is grossly oriented to person, place, and time. \par \par Left Foot:\par Inspection/Palpation: Patient is wearing a cast shoe for examination. Marked swelling with diffuse ecchymosis second, third, fourth and fifth metatarsals. \par Range of Motion: Full ankle motion. \par Stability: Joint stability is intact. \par Strength: Plantar flexion, dorsiflexion, inversion, eversion 5/5

## 2020-02-21 NOTE — ADDENDUM
[FreeTextEntry1] : I, Renea Pacheco wrote this note acting as a scribe for Dr. Moncho Wilkes on Feb 20, 2020.

## 2020-02-21 NOTE — DISCUSSION/SUMMARY
[de-identified] : Patient was advised to continue with the cast shoe for the interim, however to proceed with caution while weight bearing, as increasing the applied force to the foot can cause displacement of the metatarsal fractures. \par The patient was advised to soak the foot in warm water and Epsom salt. \par NSAIDs as tolerated. \par Follow up in 2 weeks for repeat x-rays.

## 2020-02-21 NOTE — PHYSICAL EXAM
[de-identified] : Patient is WDWN, alert, and in no acute distress. Breathing is unlabored. She is grossly oriented to person, place, and time. \par \par Left Foot:\par Inspection/Palpation: Patient is wearing a cast shoe for examination. Marked swelling with diffuse ecchymosis second, third, fourth and fifth metatarsals. \par Range of Motion: Full ankle motion. \par Stability: Joint stability is intact. \par Strength: Plantar flexion, dorsiflexion, inversion, eversion 5/5  [de-identified] : X-rays of the left foot on 02/20/2020 at [facility] revealed the bones to be borderline osteopenic. There are mildly displaced oblique fractures of the shafts of the fourth and fifth metatarsals. Joint spaces are preserved. Mild soft tissue swelling in the midfoot. \par

## 2020-02-22 ENCOUNTER — APPOINTMENT (OUTPATIENT)
Dept: OPHTHALMOLOGY | Facility: CLINIC | Age: 80
End: 2020-02-22
Payer: MEDICARE

## 2020-02-22 ENCOUNTER — NON-APPOINTMENT (OUTPATIENT)
Age: 80
End: 2020-02-22

## 2020-02-22 PROCEDURE — 92012 INTRM OPH EXAM EST PATIENT: CPT

## 2020-03-12 ENCOUNTER — APPOINTMENT (OUTPATIENT)
Dept: ORTHOPEDIC SURGERY | Facility: CLINIC | Age: 80
End: 2020-03-12

## 2020-05-15 ENCOUNTER — TRANSCRIPTION ENCOUNTER (OUTPATIENT)
Age: 80
End: 2020-05-15

## 2020-07-09 ENCOUNTER — APPOINTMENT (OUTPATIENT)
Dept: ULTRASOUND IMAGING | Facility: CLINIC | Age: 80
End: 2020-07-09

## 2020-08-18 ENCOUNTER — NON-APPOINTMENT (OUTPATIENT)
Age: 80
End: 2020-08-18

## 2020-08-18 ENCOUNTER — APPOINTMENT (OUTPATIENT)
Dept: OPHTHALMOLOGY | Facility: CLINIC | Age: 80
End: 2020-08-18
Payer: MEDICARE

## 2020-08-18 PROCEDURE — 92020 GONIOSCOPY: CPT

## 2020-08-18 PROCEDURE — 92014 COMPRE OPH EXAM EST PT 1/>: CPT

## 2020-09-02 ENCOUNTER — APPOINTMENT (OUTPATIENT)
Dept: PLASTIC SURGERY | Facility: CLINIC | Age: 80
End: 2020-09-02
Payer: SELF-PAY

## 2020-09-02 VITALS — WEIGHT: 175 LBS | BODY MASS INDEX: 26.52 KG/M2 | HEIGHT: 68 IN

## 2020-09-02 VITALS
TEMPERATURE: 98.5 F | HEART RATE: 85 BPM | DIASTOLIC BLOOD PRESSURE: 82 MMHG | RESPIRATION RATE: 20 BRPM | SYSTOLIC BLOOD PRESSURE: 149 MMHG | OXYGEN SATURATION: 85 %

## 2020-09-02 DIAGNOSIS — Z41.1 ENCOUNTER FOR COSMETIC SURGERY: ICD-10-CM

## 2020-09-02 DIAGNOSIS — Z86.39 PERSONAL HISTORY OF OTHER ENDOCRINE, NUTRITIONAL AND METABOLIC DISEASE: ICD-10-CM

## 2020-09-02 PROCEDURE — 99202 OFFICE O/P NEW SF 15 MIN: CPT | Mod: NC

## 2020-09-02 NOTE — PHYSICAL EXAM
[NI] : Normal [de-identified] : facial rhytids\par prominent jowls\par skin laxity in the neck with platysmal bands

## 2020-09-02 NOTE — ASSESSMENT
[FreeTextEntry1] : A:\par Facial rhytids with prominent jowls and skin laxity in the neck\par P:\par Patient would benefit from Rhytidectomy\par Reviewed the material risks,  benefits,  and alternatives with Ms. ALEJANDRA LOMELI  , including no surgery, and she  understands and wishes to proceed.\par

## 2020-09-02 NOTE — REASON FOR VISIT
[Consultation] : a consultation visit [FreeTextEntry1] : 80 year old women with facial rhytids and skin laxty in the neck and jowls interested in facial rejuvenation surgery

## 2020-12-11 ENCOUNTER — APPOINTMENT (OUTPATIENT)
Dept: ULTRASOUND IMAGING | Facility: CLINIC | Age: 80
End: 2020-12-11
Payer: MEDICARE

## 2020-12-11 ENCOUNTER — OUTPATIENT (OUTPATIENT)
Dept: OUTPATIENT SERVICES | Facility: HOSPITAL | Age: 80
LOS: 1 days | End: 2020-12-11
Payer: MEDICARE

## 2020-12-11 DIAGNOSIS — Z00.8 ENCOUNTER FOR OTHER GENERAL EXAMINATION: ICD-10-CM

## 2020-12-11 DIAGNOSIS — Z98.89 OTHER SPECIFIED POSTPROCEDURAL STATES: Chronic | ICD-10-CM

## 2020-12-11 PROCEDURE — 93880 EXTRACRANIAL BILAT STUDY: CPT

## 2020-12-11 PROCEDURE — 93880 EXTRACRANIAL BILAT STUDY: CPT | Mod: 26

## 2021-01-19 ENCOUNTER — APPOINTMENT (OUTPATIENT)
Dept: OPHTHALMOLOGY | Facility: CLINIC | Age: 81
End: 2021-01-19
Payer: MEDICARE

## 2021-01-19 ENCOUNTER — NON-APPOINTMENT (OUTPATIENT)
Age: 81
End: 2021-01-19

## 2021-01-19 PROCEDURE — 99213 OFFICE O/P EST LOW 20 MIN: CPT

## 2021-01-26 ENCOUNTER — NON-APPOINTMENT (OUTPATIENT)
Age: 81
End: 2021-01-26

## 2021-01-26 ENCOUNTER — APPOINTMENT (OUTPATIENT)
Dept: OPHTHALMOLOGY | Facility: CLINIC | Age: 81
End: 2021-01-26
Payer: MEDICARE

## 2021-01-26 PROCEDURE — 92136 OPHTHALMIC BIOMETRY: CPT

## 2021-01-26 PROCEDURE — 92012 INTRM OPH EXAM EST PATIENT: CPT

## 2021-03-02 ENCOUNTER — APPOINTMENT (OUTPATIENT)
Dept: OPHTHALMOLOGY | Facility: CLINIC | Age: 81
End: 2021-03-02

## 2021-04-20 ENCOUNTER — APPOINTMENT (OUTPATIENT)
Dept: OPHTHALMOLOGY | Facility: CLINIC | Age: 81
End: 2021-04-20
Payer: MEDICARE

## 2021-04-20 ENCOUNTER — NON-APPOINTMENT (OUTPATIENT)
Age: 81
End: 2021-04-20

## 2021-04-20 PROCEDURE — 68020 INCISE/DRAIN EYELID LINING: CPT | Mod: LT

## 2021-04-20 PROCEDURE — 92012 INTRM OPH EXAM EST PATIENT: CPT | Mod: 25

## 2021-05-10 ENCOUNTER — TRANSCRIPTION ENCOUNTER (OUTPATIENT)
Age: 81
End: 2021-05-10

## 2021-06-04 ENCOUNTER — NON-APPOINTMENT (OUTPATIENT)
Age: 81
End: 2021-06-04

## 2021-06-04 ENCOUNTER — APPOINTMENT (OUTPATIENT)
Dept: OPHTHALMOLOGY | Facility: CLINIC | Age: 81
End: 2021-06-04
Payer: MEDICARE

## 2021-06-04 PROCEDURE — 92012 INTRM OPH EXAM EST PATIENT: CPT

## 2021-06-04 PROCEDURE — 92015 DETERMINE REFRACTIVE STATE: CPT

## 2021-06-23 ENCOUNTER — APPOINTMENT (OUTPATIENT)
Dept: PLASTIC SURGERY | Facility: CLINIC | Age: 81
End: 2021-06-23
Payer: MEDICARE

## 2021-06-23 PROCEDURE — 99212 OFFICE O/P EST SF 10 MIN: CPT

## 2021-06-23 NOTE — HISTORY OF PRESENT ILLNESS
[FreeTextEntry1] : Patient finds difficulty reading and is interested in functional improvement.  She has little excess eyelid skin and brow ptosis bilaterally with asymmetry.  She was seen by her Ophthalmologist and was tested showing visual field obstruction

## 2021-06-23 NOTE — ASSESSMENT
[FreeTextEntry1] : A:\par Bilateral brow ptosis, left worse than the right, with visual field obstruction.\par P;\par We discussed the treatment options at some length and recommend brow lift for symptomatic improvement.  Reviewed the material risks,  benefits,  and alternatives with Ms. ALEJANDRA LOMELI  , including no surgery, and she  understands and wishes to proceed.\par

## 2021-06-23 NOTE — REASON FOR VISIT
[Consultation] : a consultation visit [FreeTextEntry1] : Patient returns with complaint of brow ptosis with visual field obstruction

## 2021-06-23 NOTE — PHYSICAL EXAM
[NI] : Normal [de-identified] : brow ptosis with right higher than the left\par brow rests just below the superior orbital rim

## 2021-08-12 ENCOUNTER — NON-APPOINTMENT (OUTPATIENT)
Age: 81
End: 2021-08-12

## 2021-08-12 ENCOUNTER — APPOINTMENT (OUTPATIENT)
Dept: OPHTHALMOLOGY | Facility: CLINIC | Age: 81
End: 2021-08-12
Payer: MEDICARE

## 2021-08-12 PROCEDURE — 92014 COMPRE OPH EXAM EST PT 1/>: CPT

## 2021-10-08 ENCOUNTER — TRANSCRIPTION ENCOUNTER (OUTPATIENT)
Age: 81
End: 2021-10-08

## 2021-10-12 ENCOUNTER — APPOINTMENT (OUTPATIENT)
Dept: PLASTIC SURGERY | Facility: HOSPITAL | Age: 81
End: 2021-10-12
Payer: MEDICARE

## 2021-10-12 PROCEDURE — 67900 REPAIR BROW DEFECT: CPT

## 2021-10-18 ENCOUNTER — APPOINTMENT (OUTPATIENT)
Dept: PLASTIC SURGERY | Facility: CLINIC | Age: 81
End: 2021-10-18
Payer: SELF-PAY

## 2021-10-18 VITALS
RESPIRATION RATE: 18 BRPM | SYSTOLIC BLOOD PRESSURE: 148 MMHG | OXYGEN SATURATION: 98 % | HEART RATE: 62 BPM | TEMPERATURE: 98.4 F | DIASTOLIC BLOOD PRESSURE: 83 MMHG

## 2021-10-18 PROCEDURE — 99024 POSTOP FOLLOW-UP VISIT: CPT

## 2021-10-18 NOTE — PHYSICAL EXAM
[NI] : Normal [de-identified] : Brow position raised and symmetrical \par lids opened\par incisions are clean and intact

## 2021-10-18 NOTE — REASON FOR VISIT
[Post Op: _________] : a [unfilled] post op visit [FreeTextEntry1] : Ms. ALEJANDRA LOMELI returns for a follow up visit, generally doing well with no complaints and no fevers or chills at home.  Generally pleased with the results of her surgery- visual field range already improved

## 2021-10-18 NOTE — ASSESSMENT
[FreeTextEntry1] : A:\par Doing well post operative\par P:\par Sutures and staples removed\par care and precautions reviewed

## 2021-11-01 ENCOUNTER — TRANSCRIPTION ENCOUNTER (OUTPATIENT)
Age: 81
End: 2021-11-01

## 2021-11-21 ENCOUNTER — TRANSCRIPTION ENCOUNTER (OUTPATIENT)
Age: 81
End: 2021-11-21

## 2021-12-16 ENCOUNTER — NON-APPOINTMENT (OUTPATIENT)
Age: 81
End: 2021-12-16

## 2021-12-16 ENCOUNTER — APPOINTMENT (OUTPATIENT)
Dept: ORTHOPEDIC SURGERY | Facility: CLINIC | Age: 81
End: 2021-12-16
Payer: MEDICARE

## 2021-12-16 VITALS
HEART RATE: 82 BPM | SYSTOLIC BLOOD PRESSURE: 138 MMHG | WEIGHT: 175 LBS | BODY MASS INDEX: 26.52 KG/M2 | DIASTOLIC BLOOD PRESSURE: 78 MMHG | HEIGHT: 68 IN

## 2021-12-16 DIAGNOSIS — M65.332 TRIGGER FINGER, LEFT MIDDLE FINGER: ICD-10-CM

## 2021-12-16 DIAGNOSIS — M18.11 UNILATERAL PRIMARY OSTEOARTHRITIS OF FIRST CARPOMETACARPAL JOINT, RIGHT HAND: ICD-10-CM

## 2021-12-16 DIAGNOSIS — M19.041 PRIMARY OSTEOARTHRITIS, RIGHT HAND: ICD-10-CM

## 2021-12-16 DIAGNOSIS — M19.042 PRIMARY OSTEOARTHRITIS, LEFT HAND: ICD-10-CM

## 2021-12-16 PROCEDURE — 73130 X-RAY EXAM OF HAND: CPT | Mod: RT

## 2021-12-16 PROCEDURE — 99214 OFFICE O/P EST MOD 30 MIN: CPT

## 2021-12-19 NOTE — PHYSICAL EXAM
[de-identified] : Right hand\par Basal joint prominent. 1-2+ crepitus, 1+ pain\par 1st metacarpal adduction. stiffness. \par Pain at basal joint recreates the patient's primary complaint.\par No A1 pulley tenderness and no triggering in any finger.\par No notable MP or PIP findings\par Heberden's node DIP joint right long finger ulnarly.\par Tenderness primarily over the ulnar side of DIP joint in the region of Heberden's node\par Faint redness over the dorsum of finger extending towards cuticle\par No edema of cuticle or blunting of eponychial margin/cuticle\par Faint redness at multiple other fingers between DIP joint and nail margin\par No evidence of paronychia in any other fingers\par No drainage, no purulence, no tenderness\par DIP2 small Heberden's nodes\par No notable Heberden's nodes thumb, ring, little finger.\par No A1 pulley tenderness and no triggering in any finger.\par \par Left hand\par Basal joint manipulation slight crepitus mild pain\par First metacarpal adduction\par No pertinent wrist findings.\par No A1 pulley tenderness and no triggering in any finger.\par No pertinent MP and PIP joint contributory findings. DIP joints: Heberden's nodes all digits\par No evidence of paronychia\par \par Neurologic: Median, ulnar, and radial motor and sensory are intact. \par Skin: No cyanosis, clubbing, or rashes.\par Vascular: Radial pulses intact.\par Lymphatic: No streaking or epitrochlear adenopathy.\par The patient is awake, alert, and oriented. Affect appropriate. Cooperative.  [de-identified] : Radiographs ordered and interpreted by me today, reviewed and discussed with the patient today.\par \par 6 views right hand\par DIP 3 sclerosis loss of joint space osteophyte formation consistent with DIP osteoarthritis.\par Dorsal ulnar osteophyte visible at distal phalanx corresponds to the palpable nodule on physical exam\par DIP 2 slight narrowing.\par Slight narrowing PIP 3 with slight sclerosis.\par PA power  view: Scapholunate gap 3 mm.  No radiocarpal or midcarpal degenerative change sclerosis or narrowing.\par Lateral basal joint: Flattening of first metacarpal base and volar beak exostosis.\par Trapezium first metacarpal narrowing consistent with basal joint arthritis stage II-early stage III.\par No other notable degenerative changes fractures or dislocations.

## 2021-12-19 NOTE — HISTORY OF PRESENT ILLNESS
[FreeTextEntry1] : 81-year-old RHD female seen by Moncho Wilkes MD, March 2019 for right basal joint arthritis and right carpal tunnel syndrome.\par \par TODAY: The patient seeks hand consultation with 2 problems.\par 1. I can't open a jar"\par 2. Pt c/o pain right long finger at DIP joint. Hurts intermittently. Can be sensitive to touch. "It's been here a long time".\par Pt had infection at base of nail, March 2020.  Patient provides history that she sought treatment at urgent care, where the provider "lanced" finger. Given antiviral and antibiotic.  Patient states that the finger is much improved.  Patient notes a very small amount of redness since the episode but no drainage or swelling..\par

## 2021-12-19 NOTE — DISCUSSION/SUMMARY
[FreeTextEntry1] : Patient has painful DIP joint right long finger. Pain is localized to this joint more ulnarly than elsewhere.\par Patient has chronic bilateral basal joint arthritis which she has adapted to. Patient uses devices to open jars etc.\par Patient has had trigger finger left long finger which is currently minimally to asymptomatic. Patient "stretches" the finger when it is painful and she finds this beneficial.\par By history the patient may have had a paronychia in the past.  There is no evidence of paronychia or infection at this time.  There is faint redness but this does not appear to indicate active infection.\par No other hand problems identified requiring treatment.\par Patient should return if she has additional questions or concerns.  Otherwise return as needed.\par Prognosis limited.\par  A lengthy and detailed discussion was held with the patient regarding analysis, treatment, and recommendations. All questions have been answered. At the conclusion the patient expressed acceptance and understanding.

## 2022-03-03 ENCOUNTER — APPOINTMENT (OUTPATIENT)
Dept: OTOLARYNGOLOGY | Facility: CLINIC | Age: 82
End: 2022-03-03
Payer: MEDICARE

## 2022-03-03 VITALS
WEIGHT: 175 LBS | HEART RATE: 109 BPM | DIASTOLIC BLOOD PRESSURE: 86 MMHG | TEMPERATURE: 98 F | HEIGHT: 68 IN | SYSTOLIC BLOOD PRESSURE: 145 MMHG | BODY MASS INDEX: 26.52 KG/M2

## 2022-03-03 DIAGNOSIS — R42 DIZZINESS AND GIDDINESS: ICD-10-CM

## 2022-03-03 DIAGNOSIS — J34.89 OTHER SPECIFIED DISORDERS OF NOSE AND NASAL SINUSES: ICD-10-CM

## 2022-03-03 DIAGNOSIS — J01.00 ACUTE MAXILLARY SINUSITIS, UNSPECIFIED: ICD-10-CM

## 2022-03-03 DIAGNOSIS — R04.0 EPISTAXIS: ICD-10-CM

## 2022-03-03 PROCEDURE — 99204 OFFICE O/P NEW MOD 45 MIN: CPT | Mod: 25

## 2022-03-03 PROCEDURE — 31231 NASAL ENDOSCOPY DX: CPT

## 2022-03-03 PROCEDURE — 92557 COMPREHENSIVE HEARING TEST: CPT

## 2022-03-03 PROCEDURE — 92567 TYMPANOMETRY: CPT

## 2022-03-03 RX ORDER — AMOXICILLIN AND CLAVULANATE POTASSIUM 875; 125 MG/1; MG/1
875-125 TABLET, COATED ORAL
Qty: 14 | Refills: 1 | Status: ACTIVE | COMMUNITY
Start: 2022-03-03 | End: 1900-01-01

## 2022-03-03 NOTE — HISTORY OF PRESENT ILLNESS
[de-identified] : Patient has a history of ruptured ear drum and at that time she was dizzy. Again this last week she had an episode of dizziness where she had pressure  in the eyebrow area. SHe has had tinnitus in the ears that sounds like rushing water. She does not have any pain in the ears right now but has some facial pressure. She had a hearing evaluation in the past and it shows a high frequency hearing loss \par Additionally she used to sing but since she had bronchitis a few years ago she has not been able to sing properly. She denies pain in the throat or issues eating, drinking or swallowing

## 2022-03-03 NOTE — ASSESSMENT
[FreeTextEntry1] : Patient with several ENT related symptoms that need to be addressed moving forward.  She has a long history of tinnitus in her right ear has a history of symmetrical by high-frequency hearing loss.  She also feels that she can sing the way she use used to she is to be out of choir.  Fiberoptic evaluation of her larynx perfectly normal no evidence of any tumors masses or polyps she was reassured endoscopically she has some evidence of staining told her to stop to use her Flonase nasal spray she is complaining of sinus pressure in her cheek area and she is tender so I put her on an antibiotic and a repeat hearing test shows that her hearing has deteriorated somewhat I recommend that she see our laryngologist Dr. Guillermo for possible referral for speech therapy as well as Dr. Loera for further evaluation for her tinnitus as she is our expert.  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

## 2022-03-03 NOTE — END OF VISIT
[FreeTextEntry3] : I saw and examined this patient in person. I have discussed with Catrina Berry, Physician Assistant, in detail the above note and agree with the above assessment and plan of care.\par

## 2022-03-03 NOTE — REVIEW OF SYSTEMS
[Hearing Loss] : hearing loss [Dizziness] : dizziness [Nasal Congestion] : nasal congestion [Nose Bleeds] : nose bleeds [Sinus Pain] : sinus pain [Sinus Pressure] : sinus pressure [Hoarseness] : hoarseness [Negative] : Heme/Lymph

## 2022-03-03 NOTE — DATA REVIEWED
[No studies available for review at this time] : No studies available for review at this time [de-identified] : Type A tymps in each ear. Hearing is -2kHz, mild to severe SNHL 3k-8kHz bilaterally.

## 2022-03-03 NOTE — REASON FOR VISIT
[Initial Consultation] : an initial consultation for [Sinusitis] : sinusitis [FreeTextEntry2] : hearing loss

## 2022-03-03 NOTE — CONSULT LETTER
[Dear  ___] : Dear  [unfilled], [Consult Letter:] : I had the pleasure of evaluating your patient, [unfilled]. [Please see my note below.] : Please see my note below. [Consult Closing:] : Thank you very much for allowing me to participate in the care of this patient.  If you have any questions, please do not hesitate to contact me. [Sincerely,] : Sincerely, [FreeTextEntry3] : Fidencio Gamboa MD\par [facility]\par Otolaryngology and Facial Plastics\par Associated Professor, [employer]\par

## 2022-04-12 ENCOUNTER — APPOINTMENT (OUTPATIENT)
Dept: OTOLARYNGOLOGY | Facility: CLINIC | Age: 82
End: 2022-04-12
Payer: MEDICARE

## 2022-04-12 VITALS
HEIGHT: 68 IN | HEART RATE: 78 BPM | DIASTOLIC BLOOD PRESSURE: 78 MMHG | TEMPERATURE: 97.6 F | WEIGHT: 175 LBS | SYSTOLIC BLOOD PRESSURE: 147 MMHG | BODY MASS INDEX: 26.52 KG/M2

## 2022-04-12 DIAGNOSIS — R49.0 DYSPHONIA: ICD-10-CM

## 2022-04-12 PROCEDURE — 99214 OFFICE O/P EST MOD 30 MIN: CPT | Mod: 25

## 2022-04-12 PROCEDURE — 31575 DIAGNOSTIC LARYNGOSCOPY: CPT

## 2022-04-12 NOTE — ASSESSMENT
[FreeTextEntry1] : Feeling much better voice seems to have stabilized is taking her allergy medications is planning for her trip to [place] for which she is incredibly excited for will follow up and see us when she returns she will follow-up with Antonieta for tinnitus and any other issues is always welcome to return here.

## 2022-04-12 NOTE — HISTORY OF PRESENT ILLNESS
[de-identified] : Patient returns for a follow up, doing well overall. She has been using her allergy medication and the Ayr gel. She has been noting that the rushing sounds in the ears are worse when she lays down and she still hears it daily. She has an apt with DR Fisher next month. She denies any recent episodes of dizziness. \par She continues to feel the hoarseness when she tries to sing. She denies any throat pain.

## 2022-05-17 ENCOUNTER — APPOINTMENT (OUTPATIENT)
Dept: OTOLARYNGOLOGY | Facility: CLINIC | Age: 82
End: 2022-05-17
Payer: MEDICARE

## 2022-05-17 VITALS
WEIGHT: 175 LBS | HEART RATE: 77 BPM | BODY MASS INDEX: 26.52 KG/M2 | SYSTOLIC BLOOD PRESSURE: 141 MMHG | DIASTOLIC BLOOD PRESSURE: 78 MMHG | HEIGHT: 68 IN

## 2022-05-17 DIAGNOSIS — H93.13 TINNITUS, BILATERAL: ICD-10-CM

## 2022-05-17 DIAGNOSIS — M26.609 UNSPECIFIED TEMPOROMANDIBULAR JOINT DISORDER: ICD-10-CM

## 2022-05-17 DIAGNOSIS — H90.3 SENSORINEURAL HEARING LOSS, BILATERAL: ICD-10-CM

## 2022-05-17 PROCEDURE — 92504 EAR MICROSCOPY EXAMINATION: CPT

## 2022-05-17 PROCEDURE — 99215 OFFICE O/P EST HI 40 MIN: CPT

## 2022-05-17 PROCEDURE — 92625 TINNITUS ASSESSMENT: CPT

## 2022-05-17 RX ORDER — LEVOFLOXACIN 500 MG/1
500 TABLET, FILM COATED ORAL DAILY
Qty: 3 | Refills: 0 | Status: DISCONTINUED | COMMUNITY
Start: 2021-10-13 | End: 2022-05-17

## 2022-05-17 RX ORDER — AMOXICILLIN 500 MG/1
500 CAPSULE ORAL
Qty: 21 | Refills: 0 | Status: DISCONTINUED | COMMUNITY
Start: 2020-01-06 | End: 2022-05-17

## 2022-05-17 RX ORDER — AZITHROMYCIN 250 MG/1
250 TABLET, FILM COATED ORAL
Qty: 6 | Refills: 0 | Status: DISCONTINUED | COMMUNITY
Start: 2020-01-27 | End: 2022-05-17

## 2022-05-17 RX ORDER — CIPROFLOXACIN HYDROCHLORIDE 500 MG/1
500 TABLET, FILM COATED ORAL
Qty: 14 | Refills: 0 | Status: DISCONTINUED | COMMUNITY
Start: 2019-10-22 | End: 2022-05-17

## 2022-05-18 PROBLEM — H93.13 BILATERAL TINNITUS: Status: ACTIVE | Noted: 2022-03-03

## 2022-05-18 PROBLEM — H90.3 BILATERAL HIGH FREQUENCY SENSORINEURAL HEARING LOSS: Status: ACTIVE | Noted: 2022-03-03

## 2022-05-18 NOTE — REASON FOR VISIT
[Initial Consultation] : an initial consultation for [FreeTextEntry2] : referred by Dr. Fidencio Gamboa for tinnitus bilaterally

## 2022-05-18 NOTE — DATA REVIEWED
[de-identified] : An audiogram was ordered and performed including TPM for patient's complaint of tinnitus\par I have independently reviewed the patient's audiogram from today and march  my findings include dorothy SNHL and TPM to 500Hz at 46dB\par

## 2022-05-18 NOTE — HISTORY OF PRESENT ILLNESS
[de-identified] : 81 year old female referred by Dr. Fidencio Gamboa for bilateral tinnitus \par Reports tinnitus started suddenly in October 2020\par Reports tinnitus is constant and louder in quiet rooms or when she lays down. \par Reports occasional headaches due to tinnitus -resolves when she uses background noise \par Describes tinnitus as a "constant waterfall" and "hissing sound"  \par Reports hearing is stable \par Last audiogram: 3/3/2022\par Currently taking Amoxicillin for sinus infection. \par Patient denies otalgia, otorrhea, ear infections, hearing loss, dizziness, vertigo.

## 2022-05-18 NOTE — PHYSICAL EXAM
[Rinne Test Air Conduction Persists > Bone Conduction Right] : air conduction greater than bone conduction on the right [Rinne Test Air Conduction Persists > Bone Conduction Left] : air conduction greater than bone conduction on the left [Hearing Oro Test (Tuning Fork On Forehead)] : no lateralization of tone [Midline] : trachea located in midline position [Normal] : no rashes [de-identified] : +TTP dorothy [Hearing Loss Right Only] : normal [Hearing Loss Left Only] : normal [Nystagmus] : ~T no ~M nystagmus was seen [Fukuda Step Test] : Fukuda Step Test was Negative [Romberg's Sign] : Romberg's sign was absent [Fistula Sign] : Fistula Sign: Negative [Past-Pointing] : Past-Pointing: Negative [[place]] : [place]: Negative

## 2022-05-22 ENCOUNTER — NON-APPOINTMENT (OUTPATIENT)
Age: 82
End: 2022-05-22

## 2022-08-02 ENCOUNTER — NON-APPOINTMENT (OUTPATIENT)
Age: 82
End: 2022-08-02

## 2022-08-02 ENCOUNTER — APPOINTMENT (OUTPATIENT)
Dept: OPHTHALMOLOGY | Facility: CLINIC | Age: 82
End: 2022-08-02

## 2022-08-02 PROCEDURE — 92014 COMPRE OPH EXAM EST PT 1/>: CPT

## 2022-08-02 PROCEDURE — 92134 CPTRZ OPH DX IMG PST SGM RTA: CPT

## 2022-09-06 ENCOUNTER — NON-APPOINTMENT (OUTPATIENT)
Age: 82
End: 2022-09-06

## 2023-01-29 ENCOUNTER — NON-APPOINTMENT (OUTPATIENT)
Age: 83
End: 2023-01-29

## 2023-08-07 ENCOUNTER — NON-APPOINTMENT (OUTPATIENT)
Age: 83
End: 2023-08-07

## 2023-08-07 ENCOUNTER — APPOINTMENT (OUTPATIENT)
Dept: OPHTHALMOLOGY | Facility: CLINIC | Age: 83
End: 2023-08-07
Payer: MEDICARE

## 2023-08-07 PROCEDURE — 92014 COMPRE OPH EXAM EST PT 1/>: CPT

## 2023-12-04 ENCOUNTER — APPOINTMENT (OUTPATIENT)
Dept: OPHTHALMOLOGY | Facility: CLINIC | Age: 83
End: 2023-12-04
Payer: MEDICARE

## 2023-12-04 ENCOUNTER — NON-APPOINTMENT (OUTPATIENT)
Age: 83
End: 2023-12-04

## 2023-12-04 PROCEDURE — 92020 GONIOSCOPY: CPT

## 2023-12-04 PROCEDURE — 92014 COMPRE OPH EXAM EST PT 1/>: CPT

## 2023-12-11 ENCOUNTER — APPOINTMENT (OUTPATIENT)
Dept: OPHTHALMOLOGY | Facility: CLINIC | Age: 83
End: 2023-12-11
Payer: MEDICARE

## 2023-12-11 ENCOUNTER — NON-APPOINTMENT (OUTPATIENT)
Age: 83
End: 2023-12-11

## 2023-12-11 PROCEDURE — 92012 INTRM OPH EXAM EST PATIENT: CPT

## 2024-01-19 ENCOUNTER — NON-APPOINTMENT (OUTPATIENT)
Age: 84
End: 2024-01-19

## 2024-01-19 ENCOUNTER — APPOINTMENT (OUTPATIENT)
Dept: OPHTHALMOLOGY | Facility: CLINIC | Age: 84
End: 2024-01-19
Payer: MEDICARE

## 2024-01-19 PROCEDURE — 92012 INTRM OPH EXAM EST PATIENT: CPT

## 2024-02-12 ENCOUNTER — NON-APPOINTMENT (OUTPATIENT)
Age: 84
End: 2024-02-12

## 2024-02-12 ENCOUNTER — APPOINTMENT (OUTPATIENT)
Dept: OPHTHALMOLOGY | Facility: CLINIC | Age: 84
End: 2024-02-12
Payer: MEDICARE

## 2024-02-12 PROCEDURE — 92012 INTRM OPH EXAM EST PATIENT: CPT

## 2024-02-12 PROCEDURE — 76513 OPH US DX ANT SGM US UNI/BI: CPT | Mod: LT

## 2024-05-02 ENCOUNTER — APPOINTMENT (OUTPATIENT)
Dept: PLASTIC SURGERY | Facility: CLINIC | Age: 84
End: 2024-05-02
Payer: MEDICARE

## 2024-05-02 VITALS — OXYGEN SATURATION: 96 % | DIASTOLIC BLOOD PRESSURE: 86 MMHG | SYSTOLIC BLOOD PRESSURE: 151 MMHG | HEART RATE: 70 BPM

## 2024-05-02 PROCEDURE — 99212 OFFICE O/P EST SF 10 MIN: CPT

## 2024-05-02 NOTE — ASSESSMENT
[FreeTextEntry1] : A: Left lateral brow ptosis, with obstruction left lateral visual field  P: Discussed the treatment options and favor ptosis correctionw ith skin excision and lateral brw lift. Reviewed the material risks,  benefits,  and alternatives with Ms. ALEJANDRA LOMELI  , including no surgery, and she  understands and wishes to proceed.  Patient prefers local anesthesia.

## 2024-05-02 NOTE — REASON FOR VISIT
[Follow-Up: _____] : a [unfilled] follow-up visit [FreeTextEntry1] : Patient returns with recent onset recurrent ptosis of the left lateral brow - interferes with her vision

## 2024-05-07 ENCOUNTER — APPOINTMENT (OUTPATIENT)
Dept: PLASTIC SURGERY | Facility: CLINIC | Age: 84
End: 2024-05-07
Payer: MEDICARE

## 2024-05-07 PROCEDURE — 67900 REPAIR BROW DEFECT: CPT | Mod: LT

## 2024-05-07 NOTE — PROCEDURE
[FreeTextEntry1] : left sided brow ptosis [FreeTextEntry2] : Left lateral brow lift [FreeTextEntry3] : Xylocaine 0.5% with epinephrine  [FreeTextEntry4] : 20 cc [FreeTextEntry6] : Left lateral upper brow skin ellipse was marked for excision to lift the lateral brow.  Following a sterile prep and drape, Xylocaine was injected for local anesthesia and skin ellipse excised.  Hemostasis was assured and distal flap advanced and elevated with Monocryl and nylon sutures.  Patient tolerated well. [FreeTextEntry7] : none

## 2024-05-07 NOTE — ASSESSMENT
[FreeTextEntry1] : A: left sided brow ptosis P: Left lateral brow lift, tolerated well, instructions reviewed.

## 2024-05-07 NOTE — REASON FOR VISIT
[Procedure: _________] : a [unfilled] procedure visit [FreeTextEntry1] : Patient returns for treatment of left sided brow ptosis

## 2024-05-16 ENCOUNTER — APPOINTMENT (OUTPATIENT)
Dept: PLASTIC SURGERY | Facility: CLINIC | Age: 84
End: 2024-05-16
Payer: MEDICARE

## 2024-05-16 DIAGNOSIS — H57.819 BROW PTOSIS, UNSPECIFIED: ICD-10-CM

## 2024-05-16 DIAGNOSIS — H53.483 GENERALIZED CONTRACTION OF VISUAL FIELD, BILATERAL: ICD-10-CM

## 2024-05-16 PROCEDURE — 99024 POSTOP FOLLOW-UP VISIT: CPT

## 2024-05-16 NOTE — REASON FOR VISIT
[Post Op: _________] : a [unfilled] post op visit [FreeTextEntry1] : Ms. ALEJANDRA LOMELI  returns for suture removal, generally doing well with no complaints and no fevers or chills at home.

## 2024-06-24 ENCOUNTER — EMERGENCY (EMERGENCY)
Facility: HOSPITAL | Age: 84
LOS: 1 days | Discharge: HOME | End: 2024-06-24
Attending: STUDENT IN AN ORGANIZED HEALTH CARE EDUCATION/TRAINING PROGRAM
Payer: MEDICARE

## 2024-06-24 VITALS
WEIGHT: 166.01 LBS | HEIGHT: 68 IN | RESPIRATION RATE: 19 BRPM | TEMPERATURE: 98 F | SYSTOLIC BLOOD PRESSURE: 172 MMHG | DIASTOLIC BLOOD PRESSURE: 87 MMHG | OXYGEN SATURATION: 97 % | HEART RATE: 69 BPM

## 2024-06-24 DIAGNOSIS — Z98.89 OTHER SPECIFIED POSTPROCEDURAL STATES: Chronic | ICD-10-CM

## 2024-06-24 PROCEDURE — 12011 RPR F/E/E/N/L/M 2.5 CM/<: CPT

## 2024-06-24 PROCEDURE — 99284 EMERGENCY DEPT VISIT MOD MDM: CPT | Mod: 25,GC

## 2024-06-24 NOTE — ED ADULT NURSE NOTE - DISTAL EXTREMITY COLOR
T(C): 36.6, Max: 36.6 (03-12 @ 11:05)  HR: 73 (72 - 90)  BP: 180/72 (166/80 - 180/72)  RR: 23 (21 - 23)  SpO2: 100% (92% - 100%)  Wt(kg): --    Gen: AAOx2, NAD  HEENT: NCAT, EOMI  Neck: Supple  CV: nml S1S2, RRR  Lungs: decreased breath sounds at right lower base  Abd: Soft, NT, ND, BS+  Ext: No edema  Neuro: Non focal color consistent with ethnicity/race

## 2024-06-24 NOTE — ED ADULT NURSE NOTE - NSICDXPASTMEDICALHX_GEN_ALL_CORE_FT
PAST MEDICAL HISTORY:  Atrial fibrillation brief episode 10/14 due to stopping Jayna for 5 days.    Bilateral Cataracts     Fracture of Patella,     Hyperparathyroidism     Hypertension     Hypothyroidism     Osteoporosis     Post-Menopause     Right Sided Sciatica     Rotator cuff tear, left

## 2024-06-24 NOTE — ED ADULT NURSE NOTE - CHIEF COMPLAINT QUOTE
Pt c/o injuries s/p trip and fall over tree root on side walk. Pt presents w/ L knee abrasions, nose and lip laceration. Pt denies LOC, denies AC.

## 2024-06-24 NOTE — ED ADULT NURSE NOTE - NSICDXPASTSURGICALHX_GEN_ALL_CORE_FT
PAST SURGICAL HISTORY:  Fracture of Patella, repair 2007 2008    H/O: Hysterectomy denies states fibroidectomy    History of Cataract Surgery     S/P bilateral breast reduction     S/P parathyroidectomy

## 2024-06-24 NOTE — ED ADULT NURSE NOTE - NSICDXFAMILYHX_GEN_ALL_CORE_FT
FAMILY HISTORY:  Mother  Still living? No  Family history of bladder cancer, Age at diagnosis: Age Unknown

## 2024-06-24 NOTE — ED ADULT NURSE NOTE - OBJECTIVE STATEMENT
pt is a 82yo F coming in for a fall. pt states she [place] in the city when the sidewalk was uprooted from a tree that she didn't see and fell face first on to sidewalk, friend was with pt and people from restraunt came to her aid. pt has a laceration to nose bilateral abrasions to knees hematoma to forehead. pt states she has no pain but cant breathe out of her nose. pt states she has an allergy to latexpt pmh: is HTN on Jayna Med. PSH: reverse shoulder replacement, surgery to left knee. VSS pt is a 82yo F coming in for a fall. pt states she was walking in the city when the sidewalk was uprooted from a tree that she didn't see and fell face first on to sidewalk, friend was with pt and people from restaurant came to her aid. pt has a laceration to nose bilateral abrasions to knees hematoma to forehead. pt states she has no pain but cant breathe out of her nose. pt states she has an allergy to latex. pt pmh:  HTN on Jayna. PSH: reverse shoulder replacement, surgery to left knee. VSS pt is a 82yo F coming in for a fall. pt states she was walking in the city when the sidewalk was uprooted from a tree that she didn't see and fell face first on to sidewalk, friend was with pt and people from restaurant came to her aid. pt has a laceration to nose bilateral abrasions to knees hematoma to forehead. pt states she has no pain but cant breathe out of her nose. pt states she has an allergy to latex. pt pmh:  HTN on Jayna. PSH: reverse shoulder replacement, surgery to left knee. pt denies cheat pain, N/V/D, SOB, no gi gu pain. VSS.

## 2024-06-24 NOTE — ED ADULT NURSE NOTE - NSFALLUNIVINTERV_ED_ALL_ED
Bed/Stretcher in lowest position, wheels locked, appropriate side rails in place/Call bell, personal items and telephone in reach/Instruct patient to call for assistance before getting out of bed/chair/stretcher/Non-slip footwear applied when patient is off stretcher/[place] to call system/Physically safe environment - no spills, clutter or unnecessary equipment/Purposeful proactive rounding/Room/bathroom lighting operational, light cord in reach

## 2024-06-24 NOTE — ED ADULT TRIAGE NOTE - CHIEF COMPLAINT QUOTE
Pt c/o injuries s/p trip and fall over tree root on side walk. Pt presents w/ L knee abrasions, nose laceration. Pt denies LOC, denies AC. Pt c/o injuries s/p trip and fall over tree root on side walk. Pt presents w/ L knee abrasions, nose and lip laceration. Pt denies LOC, denies AC.

## 2024-06-25 ENCOUNTER — APPOINTMENT (OUTPATIENT)
Dept: OTOLARYNGOLOGY | Facility: CLINIC | Age: 84
End: 2024-06-25
Payer: MEDICARE

## 2024-06-25 VITALS — WEIGHT: 166 LBS | BODY MASS INDEX: 25.16 KG/M2 | HEIGHT: 68 IN

## 2024-06-25 VITALS
HEART RATE: 68 BPM | SYSTOLIC BLOOD PRESSURE: 131 MMHG | OXYGEN SATURATION: 96 % | DIASTOLIC BLOOD PRESSURE: 6 MMHG | RESPIRATION RATE: 14 BRPM | TEMPERATURE: 99 F

## 2024-06-25 DIAGNOSIS — J34.2 DEVIATED NASAL SEPTUM: ICD-10-CM

## 2024-06-25 DIAGNOSIS — S02.2XXA FRACTURE OF NASAL BONES, INITIAL ENCOUNTER FOR CLOSED FRACTURE: ICD-10-CM

## 2024-06-25 PROCEDURE — 76377 3D RENDER W/INTRP POSTPROCES: CPT | Mod: 26

## 2024-06-25 PROCEDURE — 72125 CT NECK SPINE W/O DYE: CPT | Mod: MC

## 2024-06-25 PROCEDURE — 72125 CT NECK SPINE W/O DYE: CPT | Mod: 26,MC

## 2024-06-25 PROCEDURE — 70486 CT MAXILLOFACIAL W/O DYE: CPT | Mod: MC

## 2024-06-25 PROCEDURE — 31231 NASAL ENDOSCOPY DX: CPT

## 2024-06-25 PROCEDURE — 12011 RPR F/E/E/N/L/M 2.5 CM/<: CPT

## 2024-06-25 PROCEDURE — 99284 EMERGENCY DEPT VISIT MOD MDM: CPT | Mod: 25

## 2024-06-25 PROCEDURE — 99213 OFFICE O/P EST LOW 20 MIN: CPT | Mod: 25

## 2024-06-25 PROCEDURE — 70486 CT MAXILLOFACIAL W/O DYE: CPT | Mod: 26,MC

## 2024-06-25 PROCEDURE — 70450 CT HEAD/BRAIN W/O DYE: CPT | Mod: 26,MC

## 2024-06-25 PROCEDURE — 70450 CT HEAD/BRAIN W/O DYE: CPT | Mod: MC

## 2024-06-25 PROCEDURE — 76377 3D RENDER W/INTRP POSTPROCES: CPT

## 2024-06-25 RX ORDER — LIDOCAINE HYDROCHLORIDE AND EPINEPHRINE 10; 10 MG/ML; UG/ML
10 INJECTION, SOLUTION INFILTRATION; PERINEURAL ONCE
Refills: 0 | Status: COMPLETED | OUTPATIENT
Start: 2024-06-25 | End: 2024-06-25

## 2024-06-25 RX ADMIN — LIDOCAINE HYDROCHLORIDE AND EPINEPHRINE 10 MILLILITER(S): 10; 10 INJECTION, SOLUTION INFILTRATION; PERINEURAL at 02:54

## 2024-06-25 NOTE — ED PROVIDER NOTE - CLINICAL SUMMARY MEDICAL DECISION MAKING FREE TEXT BOX
Jose Schmid DO PGY-3: 83-year-old female past ministry of hypertension, hypothyroidism presents ED status post mechanical fall while walking outside tripped over a raised sidewalk hitting her face and complaining of inner lip laceration.  Patient denies anticoagulants, LOC.  Patient has scattered abrasions over bilateral upper and lower extremities but has been ambulatory.  Denies lightheadedness, dizziness, chest pain, shortness of breath, abdominal pain, dysuria, diarrhea, nausea, vomiting, changes in vision.  Patient is up-to-date with tetanus.  Patient only came to the ER with concern for her laceration to her upper lip on the mucosal surface. Patient with abrasions over bilateral upper extremity lower extremities, abrasion to the nasal bridge, no septal hematoma, approximately 1 cm upper lip laceration to the mucosal surface, no spinal tenderness.  Low suspicion for thoracoabdominal trauma, low suspicion for ICH although will obtain CT head, CT max face to rule out nasal bone fracture.  Laceration repair.  Reassess

## 2024-06-25 NOTE — ED PROVIDER NOTE - NSFOLLOWUPINSTRUCTIONS_ED_ALL_ED_FT
1.  Please follow-up with ENT or plastic surgeon, phone numbers provided please call for an appointment.  2.  Please return to the ER if you develop worsening headache, tractable vomiting, change in vision, confusion, bleeding from your nose that will not stop after 15 to 20 minutes of direct pressure, lightheadedness, dizziness, chest pain, shortness of breath or anything concerning

## 2024-06-25 NOTE — ED PROVIDER NOTE - ATTENDING CONTRIBUTION TO CARE
I, Dr. Ольга Yang, have personally performed a face to face medical and diagnostic evaluation of the patient. I have discussed with and reviewed the Resident's and/or ACP's and/or Medical/PA/NP student's note and agree with the History, ROS, Physical Exam and MDM unless otherwise indicated. A brief summary of my personal evaluation and impression can be found below.    HPI: See above.    PE: Primary survey intact.  GCS 15.  Well-appearing, abrasions of the mid forehead, ecchymosis of the nasal bridge, no septal hematoma, upper lip swelling with superior abrasion with small skin avulsion, dentition intact, gaping inner upper lip laceration, hemostatic.  No midline spinal tenderness.  No chest wall tenderness, breathing comfortably, clear lungs, normal heart sounds.    MDM: 83 female not on AC presenting with facial trauma following mechanical fall from standing.  Inner lip laceration will likely need repair.  Suspect nasal bone fracture.  Given age and head trauma will obtain CT head as well to evaluate for intracranial hemorrhage although lower suspicion.  Plan for CTs, pain control, lack repair and reassess.

## 2024-06-25 NOTE — ED PROVIDER NOTE - PROGRESS NOTE DETAILS
Jose Schmid, DO PGY-3: CT showing right nasal bone fracture but no ICH.  Will discharge patient to follow-up with ENT or plastics.

## 2024-06-25 NOTE — ED PROVIDER NOTE - NSFOLLOWUPCLINICS_GEN_ALL_ED_FT
[facility], [place] - ENT  Otolaryngology (ENT)  [address] [place] [address]  Phone: [phone]  Fax:   Follow Up Time: 4-6 Days    [facility] [place]  Plastic Surgery  [address] [place] [address]  Phone: [phone]  Fax:   Follow Up Time: 4-6 Days

## 2024-06-25 NOTE — ED PROVIDER NOTE - PHYSICAL EXAMINATION
GEN: Patient awake alert NAD.   HEENT: Abrasion to nasal bridge, no nasal septal hematomas, no loose teeth. EOMI, no scleral icterus, moist MM  CARDIAC: RRR, S1, S2, no murmur. no chest wall ttp  PULM: CTA B/L no wheeze, rhonchi, rales.   ABD: soft NT, ND, no rebound no guarding,  MSK: Moving all extremities, no edema.    NEURO: A&Ox3, no focal neurological deficits, no CTL midline spinal ttp  SKIN: warm, dry, no rash.

## 2024-06-25 NOTE — ED PROVIDER NOTE - OBJECTIVE STATEMENT
83-year-old female past ministry of hypertension, hypothyroidism presents ED status post mechanical fall while walking outside tripped over a raised sidewalk hitting her face and complaining of inner lip laceration.  Patient denies anticoagulants, LOC.  Patient has scattered abrasions over bilateral upper and lower extremities but has been ambulatory.  Denies lightheadedness, dizziness, chest pain, shortness of breath, abdominal pain, dysuria, diarrhea, nausea, vomiting, changes in vision.  Patient is up-to-date with tetanus.  Patient only came to the ER with concern for her laceration to her upper lip on the mucosal surface.

## 2024-06-25 NOTE — ED PROVIDER NOTE - PATIENT PORTAL LINK FT
You can access the FollowMyHealth Patient Portal offered by [facility] by registering at the following website: [email or web address]. By joining [employer]’s FollowMyHealth portal, you will also be able to view your health information using other applications (apps) compatible with our system.

## 2024-07-03 ENCOUNTER — RESULT REVIEW (OUTPATIENT)
Age: 84
End: 2024-07-03

## 2024-07-03 ENCOUNTER — OUTPATIENT (OUTPATIENT)
Dept: OUTPATIENT SERVICES | Facility: HOSPITAL | Age: 84
LOS: 1 days | End: 2024-07-03
Payer: MEDICARE

## 2024-07-03 ENCOUNTER — APPOINTMENT (OUTPATIENT)
Dept: ORTHOPEDIC SURGERY | Facility: CLINIC | Age: 84
End: 2024-07-03
Payer: MEDICARE

## 2024-07-03 ENCOUNTER — APPOINTMENT (OUTPATIENT)
Dept: CT IMAGING | Facility: CLINIC | Age: 84
End: 2024-07-03
Payer: MEDICARE

## 2024-07-03 VITALS
HEART RATE: 79 BPM | HEIGHT: 68 IN | WEIGHT: 166 LBS | RESPIRATION RATE: 18 BRPM | SYSTOLIC BLOOD PRESSURE: 121 MMHG | OXYGEN SATURATION: 96 % | DIASTOLIC BLOOD PRESSURE: 69 MMHG | BODY MASS INDEX: 25.16 KG/M2

## 2024-07-03 DIAGNOSIS — M25.569 PAIN IN UNSPECIFIED KNEE: ICD-10-CM

## 2024-07-03 PROCEDURE — 99202 OFFICE O/P NEW SF 15 MIN: CPT

## 2024-07-03 PROCEDURE — 73564 X-RAY EXAM KNEE 4 OR MORE: CPT | Mod: LT

## 2024-07-03 PROCEDURE — 76376 3D RENDER W/INTRP POSTPROCES: CPT

## 2024-07-03 PROCEDURE — 76376 3D RENDER W/INTRP POSTPROCES: CPT | Mod: 26

## 2024-07-03 PROCEDURE — 73700 CT LOWER EXTREMITY W/O DYE: CPT | Mod: 26,LT,MH

## 2024-07-03 PROCEDURE — 73700 CT LOWER EXTREMITY W/O DYE: CPT

## 2024-07-08 ENCOUNTER — NON-APPOINTMENT (OUTPATIENT)
Age: 84
End: 2024-07-08

## 2024-07-08 ENCOUNTER — APPOINTMENT (OUTPATIENT)
Dept: ORTHOPEDIC SURGERY | Facility: CLINIC | Age: 84
End: 2024-07-08

## 2024-10-14 ENCOUNTER — APPOINTMENT (OUTPATIENT)
Dept: ORTHOPEDIC SURGERY | Facility: CLINIC | Age: 84
End: 2024-10-14
Payer: MEDICARE

## 2024-10-14 DIAGNOSIS — M79.645 PAIN IN LEFT FINGER(S): ICD-10-CM

## 2024-10-14 DIAGNOSIS — M65.312 TRIGGER THUMB, LEFT THUMB: ICD-10-CM

## 2024-10-14 PROCEDURE — 73140 X-RAY EXAM OF FINGER(S): CPT

## 2024-10-14 PROCEDURE — 99213 OFFICE O/P EST LOW 20 MIN: CPT | Mod: 25

## 2024-10-14 PROCEDURE — 20550 NJX 1 TENDON SHEATH/LIGAMENT: CPT | Mod: LT

## 2024-10-31 ENCOUNTER — APPOINTMENT (OUTPATIENT)
Dept: OPHTHALMOLOGY | Facility: CLINIC | Age: 84
End: 2024-10-31

## 2025-02-10 ENCOUNTER — APPOINTMENT (OUTPATIENT)
Dept: ORTHOPEDIC SURGERY | Facility: CLINIC | Age: 85
End: 2025-02-10
Payer: MEDICARE

## 2025-02-10 VITALS — BODY MASS INDEX: 25.16 KG/M2 | WEIGHT: 166 LBS | HEIGHT: 68 IN

## 2025-02-10 DIAGNOSIS — S39.012D STRAIN OF MUSCLE, FASCIA AND TENDON OF LOWER BACK, SUBSEQUENT ENCOUNTER: ICD-10-CM

## 2025-02-10 DIAGNOSIS — M70.61 TROCHANTERIC BURSITIS, RIGHT HIP: ICD-10-CM

## 2025-02-10 DIAGNOSIS — M47.817 SPONDYLOSIS W/OUT MYELOPATHY OR RADICULOPATHY, LUMBOSACRAL REGION: ICD-10-CM

## 2025-02-10 DIAGNOSIS — S76.311A STRAIN OF MUSCLE, FASCIA AND TENDON OF THE POSTERIOR MUSCLE GROUP AT THIGH LEVEL, RIGHT THIGH, INITIAL ENCOUNTER: ICD-10-CM

## 2025-02-10 PROCEDURE — 73502 X-RAY EXAM HIP UNI 2-3 VIEWS: CPT

## 2025-02-10 PROCEDURE — 99204 OFFICE O/P NEW MOD 45 MIN: CPT

## 2025-02-10 PROCEDURE — 72100 X-RAY EXAM L-S SPINE 2/3 VWS: CPT

## 2025-03-05 ENCOUNTER — NON-APPOINTMENT (OUTPATIENT)
Age: 85
End: 2025-03-05

## 2025-03-05 ENCOUNTER — APPOINTMENT (OUTPATIENT)
Dept: OTOLARYNGOLOGY | Facility: CLINIC | Age: 85
End: 2025-03-05
Payer: MEDICARE

## 2025-03-05 VITALS
OXYGEN SATURATION: 98 % | SYSTOLIC BLOOD PRESSURE: 159 MMHG | DIASTOLIC BLOOD PRESSURE: 79 MMHG | BODY MASS INDEX: 25.16 KG/M2 | HEART RATE: 83 BPM | WEIGHT: 166 LBS | TEMPERATURE: 97.8 F | HEIGHT: 68 IN

## 2025-03-05 DIAGNOSIS — R49.0 DYSPHONIA: ICD-10-CM

## 2025-03-05 PROCEDURE — 99213 OFFICE O/P EST LOW 20 MIN: CPT | Mod: 25

## 2025-03-05 PROCEDURE — 31575 DIAGNOSTIC LARYNGOSCOPY: CPT

## 2025-03-05 RX ORDER — METHYLPREDNISOLONE 4 MG/1
4 TABLET ORAL
Qty: 1 | Refills: 0 | Status: ACTIVE | COMMUNITY
Start: 2025-03-05 | End: 1900-01-01

## 2025-03-12 ENCOUNTER — APPOINTMENT (OUTPATIENT)
Dept: OPHTHALMOLOGY | Facility: CLINIC | Age: 85
End: 2025-03-12
Payer: MEDICARE

## 2025-03-12 ENCOUNTER — NON-APPOINTMENT (OUTPATIENT)
Age: 85
End: 2025-03-12

## 2025-03-12 PROCEDURE — 92012 INTRM OPH EXAM EST PATIENT: CPT

## 2025-03-12 PROCEDURE — 76513 OPH US DX ANT SGM US UNI/BI: CPT | Mod: LT

## 2025-03-24 ENCOUNTER — APPOINTMENT (OUTPATIENT)
Dept: ORTHOPEDIC SURGERY | Facility: CLINIC | Age: 85
End: 2025-03-24
Payer: MEDICARE

## 2025-03-24 VITALS — WEIGHT: 166 LBS | HEIGHT: 68 IN | BODY MASS INDEX: 25.16 KG/M2

## 2025-03-24 DIAGNOSIS — S76.319D STRAIN OF MUSCLE, FASCIA AND TENDON OF THE POSTERIOR MUSCLE GROUP AT THIGH LEVEL, UNSPECIFIED THIGH, SUBSEQUENT ENCOUNTER: ICD-10-CM

## 2025-03-24 PROCEDURE — 99215 OFFICE O/P EST HI 40 MIN: CPT

## 2025-04-09 ENCOUNTER — APPOINTMENT (OUTPATIENT)
Dept: OTOLARYNGOLOGY | Facility: CLINIC | Age: 85
End: 2025-04-09
Payer: MEDICARE

## 2025-04-09 VITALS
HEIGHT: 67 IN | BODY MASS INDEX: 26.06 KG/M2 | OXYGEN SATURATION: 96 % | HEART RATE: 68 BPM | WEIGHT: 166 LBS | DIASTOLIC BLOOD PRESSURE: 78 MMHG | SYSTOLIC BLOOD PRESSURE: 131 MMHG

## 2025-04-09 DIAGNOSIS — M81.0 AGE-RELATED OSTEOPOROSIS W/OUT CURRENT PATHOLOGICAL FRACTURE: ICD-10-CM

## 2025-04-09 PROCEDURE — 99214 OFFICE O/P EST MOD 30 MIN: CPT

## 2025-04-10 LAB
CALCIUM SERPL-MCNC: 9.2 MG/DL
CALCIUM SERPL-MCNC: 9.2 MG/DL
PARATHYROID HORMONE INTACT: 37 PG/ML

## 2025-04-23 NOTE — ASU PATIENT PROFILE, ADULT - NSICDXPASTMEDICALHX_GEN_ALL_CORE_FT
PAST MEDICAL HISTORY:  Atrial fibrillation brief episode 10/14 due to stopping Jayna for 5 days.    Fracture of Patella, surgically reapired    Hyperparathyroidism     Hypertension     Hypothyroidism     Osteoporosis     Post-Menopause     Right Sided Sciatica     Rotator cuff tear, left      PAST MEDICAL HISTORY:  Atrial fibrillation brief episode 10/14 due to stopping Jayna for 5 days.    Fracture of Patella, surgically reapired    Hyperparathyroidism     Hypertension     Hypothyroidism     Insulin resistance     Osteoporosis     Post-Menopause     Right Sided Sciatica     Rotator cuff tear, left

## 2025-04-23 NOTE — ASU PATIENT PROFILE, ADULT - NSICDXPASTSURGICALHX_GEN_ALL_CORE_FT
PAST SURGICAL HISTORY:  Fracture of Patella, repair 2007 2008    H/O total shoulder replacement, right     H/O: Hysterectomy denies states fibroidectomy    History of Cataract Surgery Intraocular lens implant (IOL)  bilateral    S/P bilateral breast reduction     S/P parathyroidectomy

## 2025-04-23 NOTE — ASU PATIENT PROFILE, ADULT - VISION (WITH CORRECTIVE LENSES IF THE PATIENT USUALLY WEARS THEM):
Occasionally blurry - left eye/Normal vision: sees adequately in most situations; can see medication labels, newsprint

## 2025-04-23 NOTE — ASU PATIENT PROFILE, ADULT - NSCAFFEINETYPE_GEN_ALL_CORE_SD
coffee/tea
Render In Strict Bullet Format?: No
Detail Level: Zone
Initiate Treatment: TRIAMCINOLONE TOPICAL CREAM BID M-FR PRN FLARES.

## 2025-04-23 NOTE — ASU PATIENT PROFILE, ADULT - FALL HARM RISK - HARM RISK INTERVENTIONS
Communicate Risk of Fall with Harm to all staff/Reinforce activity limits and safety measures with patient and family/Review medications for side effects contributing to fall risk/Tailored Fall Risk Interventions/Visual Cue: Yellow wristband and red socks/Bed in lowest position, wheels locked, appropriate side rails in place/Call bell, personal items and telephone in reach/Instruct patient to call for assistance before getting out of bed or chair/Non-slip footwear when patient is out of bed/[place] to call system/Physically safe environment - no spills, clutter or unnecessary equipment/Purposeful Proactive Rounding/Room/bathroom lighting operational, light cord in reach

## 2025-04-23 NOTE — ASU PATIENT PROFILE, ADULT - NS TRANSFER PATIENT BELONGINGS
Keys, VALDO, 3 credit cards, gray fountain pen/Wrist Watch/Cell Phone/PDA (specify)/Other belongings

## 2025-04-24 ENCOUNTER — OUTPATIENT (OUTPATIENT)
Dept: OUTPATIENT SERVICES | Facility: HOSPITAL | Age: 85
LOS: 1 days | End: 2025-04-24
Payer: MEDICARE

## 2025-04-24 VITALS
HEART RATE: 82 BPM | RESPIRATION RATE: 16 BRPM | DIASTOLIC BLOOD PRESSURE: 75 MMHG | OXYGEN SATURATION: 95 % | SYSTOLIC BLOOD PRESSURE: 135 MMHG

## 2025-04-24 VITALS
DIASTOLIC BLOOD PRESSURE: 74 MMHG | HEIGHT: 67.5 IN | SYSTOLIC BLOOD PRESSURE: 132 MMHG | WEIGHT: 175.93 LBS | OXYGEN SATURATION: 97 % | RESPIRATION RATE: 13 BRPM | HEART RATE: 63 BPM | TEMPERATURE: 98 F

## 2025-04-24 DIAGNOSIS — Z98.89 OTHER SPECIFIED POSTPROCEDURAL STATES: Chronic | ICD-10-CM

## 2025-04-24 DIAGNOSIS — H27.8 OTHER SPECIFIED DISORDERS OF LENS: ICD-10-CM

## 2025-04-24 DIAGNOSIS — H43.12 VITREOUS HEMORRHAGE, LEFT EYE: ICD-10-CM

## 2025-04-24 DIAGNOSIS — Z96.611 PRESENCE OF RIGHT ARTIFICIAL SHOULDER JOINT: Chronic | ICD-10-CM

## 2025-04-24 LAB — GLUCOSE BLDC GLUCOMTR-MCNC: 95 MG/DL — SIGNIFICANT CHANGE UP (ref 70–99)

## 2025-04-24 PROCEDURE — 67036 REMOVAL OF INNER EYE FLUID: CPT | Mod: LT

## 2025-04-24 PROCEDURE — 67036 REMOVAL OF INNER EYE FLUID: CPT | Mod: AS,LT

## 2025-04-24 PROCEDURE — 82962 GLUCOSE BLOOD TEST: CPT

## 2025-04-24 RX ORDER — METFORMIN HYDROCHLORIDE 850 MG/1
1 TABLET ORAL
Refills: 0 | DISCHARGE

## 2025-04-24 RX ORDER — AMLODIPINE AND OLMESARTAN MEDOXOMIL 5; 40 MG/1; MG/1
1 TABLET ORAL
Refills: 0 | DISCHARGE

## 2025-04-24 RX ORDER — LEVOTHYROXINE SODIUM 300 MCG
1 TABLET ORAL
Refills: 0 | DISCHARGE

## 2025-04-24 NOTE — ASU DISCHARGE PLAN (ADULT/PEDIATRIC) - PROVIDER TOKENS
(4) walks frequently
FREE:[LAST:[David],FIRST:[Colton],PHONE:[(   )    -],FAX:[(   )    -],ADDRESS:[[address] [place] [address]],SCHEDULEDAPPT:[04/25/2025],SCHEDULEDAPPTTIME:[10:00 AM],ESTABLISHEDPATIENT:[T]]

## 2025-04-24 NOTE — ASU DISCHARGE PLAN (ADULT/PEDIATRIC) - FINANCIAL ASSISTANCE
[facility] provides services at a reduced cost to those who are determined to be eligible through [facility]’s financial assistance program. Information regarding [facility]’s financial assistance program can be found by going to [email or web address] or by calling [phone].

## 2025-04-24 NOTE — ASU DISCHARGE PLAN (ADULT/PEDIATRIC) - CARE PROVIDER_API CALL
Colton Hernandez  [address] [id number]  Phone: (   )    -  Fax: (   )    -  Established Patient  Scheduled Appointment: 04/25/2025 10:00 AM

## 2025-04-24 NOTE — ASU DISCHARGE PLAN (ADULT/PEDIATRIC) - ASU DC SPECIAL INSTRUCTIONSFT
Continue Home Medication Regimen as per your Primary Care Provider    Follow up in the Retina Surgeon's office tomorrrow

## 2025-05-14 PROBLEM — E88.819 INSULIN RESISTANCE, UNSPECIFIED: Chronic | Status: ACTIVE | Noted: 2025-04-24

## 2025-05-27 ENCOUNTER — APPOINTMENT (OUTPATIENT)
Dept: ORTHOPEDIC SURGERY | Facility: CLINIC | Age: 85
End: 2025-05-27

## 2025-07-30 ENCOUNTER — OUTPATIENT (OUTPATIENT)
Dept: OUTPATIENT SERVICES | Facility: HOSPITAL | Age: 85
LOS: 1 days | End: 2025-07-30
Payer: MEDICARE

## 2025-07-30 ENCOUNTER — APPOINTMENT (OUTPATIENT)
Dept: CT IMAGING | Facility: CLINIC | Age: 85
End: 2025-07-30
Payer: MEDICARE

## 2025-07-30 DIAGNOSIS — Z98.89 OTHER SPECIFIED POSTPROCEDURAL STATES: Chronic | ICD-10-CM

## 2025-07-30 DIAGNOSIS — R13.12 DYSPHAGIA, OROPHARYNGEAL PHASE: ICD-10-CM

## 2025-07-30 DIAGNOSIS — R10.84 GENERALIZED ABDOMINAL PAIN: ICD-10-CM

## 2025-07-30 DIAGNOSIS — R14.0 ABDOMINAL DISTENSION (GASEOUS): ICD-10-CM

## 2025-07-30 DIAGNOSIS — Z96.611 PRESENCE OF RIGHT ARTIFICIAL SHOULDER JOINT: Chronic | ICD-10-CM

## 2025-07-30 PROCEDURE — 74177 CT ABD & PELVIS W/CONTRAST: CPT | Mod: 26

## 2025-07-30 PROCEDURE — 74177 CT ABD & PELVIS W/CONTRAST: CPT | Mod: MH

## (undated) DEVICE — ELCTR BVI ACCU-TEMP CAUTERY SHAFT FINE TIP 1/2"

## (undated) DEVICE — PACK VITRECTOMY

## (undated) DEVICE — SUT NYLON 10-0 12" CU-5

## (undated) DEVICE — CONSTELLATION TOTAL PLUS PAK 23G

## (undated) DEVICE — SYSTEM ENTRY OPHTHALMIC VALVED 25G

## (undated) DEVICE — CANNULA ALCON SOFT TIP 25G

## (undated) DEVICE — GLV 7.5 PROTEXIS (WHITE)

## (undated) DEVICE — KNFE OPTH SLIT ANG 3MM

## (undated) DEVICE — SOL IRR BAL SALT + 500ML

## (undated) DEVICE — SYSTEM ENTRY OPHTHALMIC VALVED 23G

## (undated) DEVICE — SUT VICRYL 8-0 12" TG140-8 DA

## (undated) DEVICE — CANNULA ALCON PROVISC 27G THREADED HUB

## (undated) DEVICE — VENODYNE/SCD SLEEVE CALF MEDIUM

## (undated) DEVICE — NDL HYPO SAFE 22G X 1.5" (BLACK)

## (undated) DEVICE — ELCTR ERASER BI-P BVL 45DEG 18G

## (undated) DEVICE — LIGHT SHIELD CORNEAL

## (undated) DEVICE — DRAPE OPHTHALMIC W POUCH

## (undated) DEVICE — SOL BALANCE SALT 15ML

## (undated) DEVICE — ELCTR BIPOLAR CORD J&J 12FT DISP

## (undated) DEVICE — KNIFE OPTH SHARPOINT CRESCENT 2MM

## (undated) DEVICE — WARMING BLANKET LOWER ADULT

## (undated) DEVICE — KNFE VITREC 20G

## (undated) DEVICE — SUT VICRYL 10-0 12" CS160-8 DA

## (undated) DEVICE — SUT GORETEX CV-8 (7-0) 30" PT-9

## (undated) DEVICE — PACK CONSTELLATION POST 25G 20K